# Patient Record
Sex: MALE | Race: WHITE | Employment: FULL TIME | URBAN - METROPOLITAN AREA
[De-identification: names, ages, dates, MRNs, and addresses within clinical notes are randomized per-mention and may not be internally consistent; named-entity substitution may affect disease eponyms.]

---

## 2020-10-02 ENCOUNTER — HOSPITAL ENCOUNTER (OUTPATIENT)
Dept: PHYSICAL THERAPY | Age: 50
Setting detail: THERAPIES SERIES
Discharge: HOME | End: 2020-10-02
Payer: COMMERCIAL

## 2020-10-02 PROCEDURE — 97110 THERAPEUTIC EXERCISES: CPT

## 2020-10-02 PROCEDURE — 97162 PT EVAL MOD COMPLEX 30 MIN: CPT

## 2020-10-02 NOTE — PROGRESS NOTES
[address]     Ph: [phone]  Fax: [phone]    [x] Certification  [] Recertification [x]  Plan of Care  [] Progress Note [] Discharge      To: Radha Goodson MD      From:  Sergo Neal  Patient: Aida Riding     : 1970  Diagnosis: unilateral primary osteoarthritis, right hip, Presence of unspecified artificial hip joint (right hip, 2020)     Date: 10/2/2020  Treatment Diagnosis: muscle weakness, decreased endurance, gait instability     Progress Report Period from:  10/2/2020  to 10/2/2020    Total # of Visits to Date: 1   No Show: 0    Canceled Appointment: 0     OBJECTIVE:   Short Term Goals - Time Frame for Short term goals: 2 weeks    Goals Current/Discharge status  Met   Short term goal 1: independent with HEP  Pending new HEP [] yes  [] no     Long Term Goals - Time Frame for Long term goals : 6 weeks  Goals Current/ Discharge status Met   Long term goal 1: Improve LEFS score by >/= 9 points to improve functional skills 27/80 [] yes  [] no   Long term goal 2: improve MMT scores to at least 4/5 throught RLE Strength RLE  Comment: hip 3/5 in flexion, abduction; knee:  at least 4+/5 in flex, ext; DF:  5/5  Strength LLE  Comment: hip:  4/5 throughout, knee:  4+/5 noting lack of full knee ext., DF:  5/5            [] yes  [] no   Long term goal 3:Improve gait distance to at least 1500' with most indep AD with uprigth posture Ambulation 1  Surface: carpet  Device: Rolling Walker  Assistance: Independent  Quality of Gait: With Front-wheeled walker:  slight favoring RLE, shortened step length with R, Upright symmetrical posture, once walker was raised to patient's needed height. Bilateral heel strike, which patient states he has intentionally worked to improve. Gait with SPcane:  marked limp favoring RLE, with standing showing marked weight shift to LLE.   Marked lean onto the cane with anticipated weight shift onto right. When climbing stairs, patient uses left hip rotation to lift LLE, compensating for decreased knee mobility. Patient appears to have an acquired leg length difference, which patient states he is aware of . Gait Deviations: Decreased step length, Decreased arm swing, Decreased step height  Distance: 1200 feet in 6-min walk test with front wheeled walker. Ambulation  Ambulation?: Yes  WB Status: to tolerance   [] yes  [] no   Long term goal 4: Pt will demo improved R Hip abd, ext, IR, and ER >/=10 deg to increase ease with Le dressing flex WNL's, abd 24, IR 15, ER 20, ext -10 from neutral (10/7/20)  [] yes  [] no     Body structures, Functions, Activity limitations: Decreased functional mobility , Decreased ADL status, Decreased ROM, Decreased strength, Decreased endurance, Decreased balance, Decreased posture  Assessment: Patient presented to outpatient clinic with Fwwalker, forward lean posture. Raised walker 1\" with noted improvement in upright posture. Patient uses walker independently, and showed safety in ambulation, transfers, and exercises. Patient showed marked limp, favoring RLE with cane use at this time. Patient was instructed to continue walker use at home, to maintain upright, symmetric gait posture. Patient would benefit from skilled PT services to improve strength, gait, balance, posture and endurance. Discussed with patient use of a shoe insert to counter leg length difference, and patient was receptive to this. Patient appears hard-working, and motivated to return to driving, work, and overall independence. He would benefit from PT services  to improve gait, balance, strength and endurance.   Specific instructions for Next Treatment: Begin HEP  Prognosis: Good      PT Education: Goals;PT Role;Plan of Care;General Safety;Gait Training    PLAN: [x] Evaluate and Treat  Frequency/Duration:  Plan  Times per week: 2  Plan weeks: 6 weeks  Specific instructions for Next Treatment: Begin HEP  Current Treatment Recommendations: Strengthening, ROM, Balance Training, Functional Mobility Training, Endurance Training, Gait Training, Stair training, Neuromuscular Re-education, Manual Therapy - Soft Tissue Mobilization, Manual Therapy - Joint Manipulation, Pain Management, Home Exercise Program, Safety Education & Training, Patient/Caregiver Education & Training, Modalities, Positioning  Plan Comment: transfer to [facility]     Precautions:Restrictions/Precautions: Up as Tolerated                           Patient Status:[x] Continue/ Initiate plan of Care    [] Discharge PT. Recommend pt continue with HEP. [] Additional visits requested, Please re-certify for additional visits:          Signature: Electronically signed by Fernando Adorno on 10/2/20 at 4:09 PM EDT    ADDENDUM CREATED TO UPDATE GOALS 10/7/20 Electronically signed by Lucio Leon PT on 10/7/2020 at 11:52 AM    If you have any questions or concerns, please don't hesitate to call. Thank you for your referral.    I have reviewed this plan of care and certify a need for medically necessary rehabilitation services.     Physician Signature:__________________________________________________________  Date:  Please sign and return

## 2020-10-02 NOTE — PROGRESS NOTES
[address]  PHYSICAL THERAPY EVALUATION    Date: 10/2/2020  Patient Name: Chelle Ortiz       MRN: [id number]   Account: [de-identified]   : 1970  (44 y.o.)   Gender: male   Referring Practitioner: Haseeb Machado MD                 Diagnosis: unilateral primary osteoarthritis, right hip, Presence of unspecified artificial hip joint (right hip, 2020)  Treatment Diagnosis: muscle weakness, decreased endurance, gait instability  Additional Pertinent Hx: Previous knee replacements, bilat;  arthritis, HTN, with medication; and previous rotator cuff surgery        Past Medical History:  has no past medical history on file. Past Surgical History:   has no past surgical history on file. Vital Signs  Patient Currently in Pain: No   Pain Screening  Patient Currently in Pain: No      Lives With: Spouse  Type of Home: House  Home Layout: One level  Home Access: Stairs to enter with rails  Entrance Stairs - Number of Steps: 2 with rail on right going up  Entrance Stairs - Rails: Right  Bathroom Shower/Tub: Walk-in shower  Bathroom Toilet: Handicap height  Bathroom Equipment: Grab bars in shower  Bathroom Accessibility: Accessible  Home Equipment: Rolling walker;Cane;Grab bars  Receives Help From: Family  ADL Assistance: Needs assistance  Dressing: Minimal assistance  Homemaking Assistance: Needs assistance  Ambulation Assistance: Independent(uses fwwalker)  Transfer Assistance: Independent  Active : No(Patient plans to return to driving. not currently driving)  Mode of Transportation: Car  Occupation: Full time employment  Type of occupation: standing work  Leisure & Hobbies: golf        Subjective:  Subjective: Patient presents with front wheeled walker, reports recent right hip replacement, on 2020. States he received home care physical therapy, including instruction for cane use.   States he does not use a cane now, would like to progress, to eventually gait with no AD. Patient has previously undergone both left and right knee replacements, and had rotator cuff surgery in the past.  Patient takes blood pressure meds for HTN. Patient states he has no hip precautions. Prior level of function:  independent in all ADLs, including driving, and full time work, which involves standing throughout most of the day. Objective:   Sensation  Overall Sensation Status: WFL    Ambulation 1  Surface: carpet  Device: Rolling Walker  Assistance: Independent  Quality of Gait: With Front-wheeled walker:  slight favoring RLE, shortened step length with R, Upright symmetrical posture, once walker was raised to patient's needed height. Bilateral heel strike, which patient states he has intentionally worked to improve. Gait with SPcane:  marked limp favoring RLE, with standing showing marked weight shift to LLE. Marked lean onto the cane with anticipated weight shift onto right. When climbing stairs, patient uses left hip rotation to lift LLE, compensating for decreased knee mobility. Patient appears to have an acquired leg length difference, which patient states he is aware of . Gait Deviations: Decreased step length, Decreased arm swing, Decreased step height  Distance: 1200 feet in 6-min walk test with front wheeled walker.   Stairs  # Steps : 12  Stairs Height: 6\"  Rails: Right ascending  Device: No Device, Rolling walker     Transfers  Sit to Stand: Independent  Stand to sit: Modified independent  Bed to Chair: Modified independent  Stand Pivot Transfers: Modified independent    Strength RLE  Comment: hip 3/5 in flexion, abduction; knee:  at least 4+/5 in flex, ext; DF:  5/5  Strength LLE  Comment: hip:  4/5 throughout, knee:  4+/5 noting lack of full knee ext., DF:  5/5   AROM RLE (degrees)  RLE AROM: WFL     AROM LLE (degrees)  LLE AROM : WFL(lacking full left knee ext)           Bed mobility  Rolling to Left: Independent  Rolling to Right: Independent  Supine to Sit: Independent  Sit to Supine: Independent       Exercises:   Exercises  Exercise 1: Sidelying right hip abduction, able to to 5 with noted fatigue  Exercise 2: Supine hip abd x10  Exercise 3: Supine SLR,  RLE x10; LLE x10, with fatigue noted  Exercise 20: begin HEP    *Indicates exercise,modality, or manual techniques to be initiated when appropriate    Assessment: Body structures, Functions, Activity limitations: Decreased functional mobility , Decreased ADL status, Decreased ROM, Decreased strength, Decreased endurance, Decreased balance, Decreased posture  Assessment: Patient presented to outpatient clinic with Fwwalker, forward lean posture. Raised walker 1\" with noted improvement in upright posture. Patient uses walker independently, and showed safety in ambulation, transfers, and exercises. Patient showed marked limp, favoring RLE with cane use at this time. Patient was instructed to continue walker use at home, to maintain upright, symmetric gait posture. Patient would benefit from skilled PT services to improve strength, gait, balance, posture and endurance. Discussed with patient use of a shoe insert to counter leg length difference, and patient was receptive to this. Patient appears hard-working, and motivated to return to driving, work, and overall independence. He would benefit from PT services  to improve gait, balance, strength and endurance.   Specific instructions for Next Treatment: Begin HEP  Prognosis: Good  Activity Tolerance: Patient Tolerated treatment well     Decision Making: Medium Complexity     Plan  Frequency/Duration:  Plan  Times per week: 2  Plan weeks: 6 weeks  Specific instructions for Next Treatment: Begin HEP  Current Treatment Recommendations: Strengthening, ROM, Balance Training, Functional Mobility Training, Endurance Training, Gait Training, Stair training, Neuromuscular Re-education, Manual Therapy - Soft Tissue Mobilization, Manual Therapy - Joint Manipulation, Pain Management, Home Exercise Program, Safety Education & Training, Patient/Caregiver Education & Training, Modalities, Positioning  Plan Comment: transfer to [facility]     Patient Education  New Education Provided: PT Education: Goals;PT Role;Plan of Care;General Safety;Gait Training    POST-PAIN     Pain Rating (0-10 pain scale):  0 /10  Location and pain description same as pre-treatment unless indicated. Action: [x] NA  [] Call Physician  [] Perform HEP  [] Meds as prescribed    Evaluation and patient rights have been reviewed and patient agrees with plan of care. Yes  [x]  No  []   Explain:       [facility]ka Fall Risk Assessment  Risk Factor Scale  Score   History of Falls [] Yes  [x] No 25  0 0   Secondary Diagnosis [] Yes  [x] No 15  0 0   Ambulatory Aid [] Furniture  [x] Crutches/cane/walker  [] None/bedrest/wheelchair/nurse 30  15  0 15   IV/Heparin Lock [] Yes  [x] No 20  0 0   Gait/Transferring [] Impaired  [x] Weak  [] Normal/bedrest/immobile 20  10  0 10   Mental Status [] Forgets limitations  [x] Oriented to own ability 15  0 0      Total:25     Based on the Assessment score: check the appropriate box. []  No intervention needed   Low =   Score of 0-24  [x]  Use standard prevention interventions Moderate =  Score of 24-44   [x] Discuss fall prevention strategies   [x] Indicate moderate falls risk on eval  []  Use high risk prevention interventions High = Score of 45 and higher   [] Discuss fall prevention strategies   [] Provide supervision during treatment time    Goals  Short term goals  Time Frame for Short term goals: 2 weeks  Short term goal 1: independent with HEP  Long term goals  Time Frame for Long term goals : 6 weeks  Long term goal 1: Improve LEFS score by >/= 9 points to improve functional skills  Long term goal 2: improve MMT scores to at least 4/5 throught RLE  Long term goal 3: Improve gait distance to at least 1500' with most indep AD.   Long term goal 4: Progress to cane use with upright, symmetric posture     PT Individual Minutes  Time In: 5180  Time Out: 1436  Minutes: 61  Timed Code Treatment Minutes: 61 Minutes  Procedure Minutes:51     Timed Activity Minutes Units   Ther Ex 10 1       Electronically signed by Go Aquino on 10/2/20 at 3:52 PM EDT

## 2020-10-05 ENCOUNTER — HOSPITAL ENCOUNTER (OUTPATIENT)
Dept: PHYSICAL THERAPY | Age: 50
Setting detail: THERAPIES SERIES
Discharge: HOME | End: 2020-10-05
Payer: COMMERCIAL

## 2020-10-05 PROCEDURE — 97110 THERAPEUTIC EXERCISES: CPT

## 2020-10-05 PROCEDURE — 97140 MANUAL THERAPY 1/> REGIONS: CPT

## 2020-10-05 ASSESSMENT — PAIN DESCRIPTION - ORIENTATION: ORIENTATION: RIGHT

## 2020-10-05 ASSESSMENT — PAIN DESCRIPTION - DESCRIPTORS: DESCRIPTORS: SORE

## 2020-10-05 ASSESSMENT — PAIN DESCRIPTION - PAIN TYPE: TYPE: ACUTE PAIN

## 2020-10-05 ASSESSMENT — PAIN SCALES - GENERAL: PAINLEVEL_OUTOF10: 2

## 2020-10-05 ASSESSMENT — PAIN DESCRIPTION - LOCATION: LOCATION: HIP

## 2020-10-05 NOTE — PROGRESS NOTES
[address] [address]  Outpatient Physical Therapy    Treatment Note        Date: 10/5/2020  Patient: Zafar Gambino  : 1970  ACCT #: [de-identified]  Referring Practitioner: Freddy Gibbs MD  Diagnosis: unilateral primary osteoarthritis, right hip, Presence of unspecified artificial hip joint (right hip, 2020)    Visit Information:  PT Visit Information  Total # of Visits to Date: 2  No Show: 0  Canceled Appointment: 0  Progress Note Counter: 10    Subjective: Pt presenting to appt reporting 2/10 pain level currently stating \"I took a Percocet before I came but I feel like it's getting better every day. \"     HEP Compliance:  [x] Good [] Fair [] Poor [] Reports not doing due to:    Vital Signs  Patient Currently in Pain: Yes   Pain Screening  Patient Currently in Pain: Yes  Pain Assessment  Pain Assessment: 0-10  Pain Level: 2  Pain Type: Acute pain  Pain Location: Hip  Pain Orientation: Right  Pain Descriptors: Sore    OBJECTIVE:   Exercises  Exercise 1: Sidelying right hip abduction (progress as able)*  Exercise 2: Supine hip abd/flex w/ sliding board x20 ea  Exercise 3: Supine SLR w/ QS between reps,  RLE x10; LLE x10, with fatigue noted (cues needed to re  Exercise 4: SciFit L1 5 min  Exercise 5: H/L ABD/ADD 5\"x15 ea YTB  Exercise 6: Clams x10 Rt only  Exercise 7: Seated HS stretch w/ stool 3x30\" Rt  Exercise 8: Mod saundra stretch 3x30\" Rt  Exercise 20: HEP: mod saundra stretch, H/L hip ABD/ADD, clams    Strength: [x] NT  [] MMT completed:      ROM: [x] NT  [] ROM measurements:      Manual:   Manual therapy  PROM: RT hip: Supine flexion, IR/ER as tolerated  Soft Tissue Mobalization: Foam roll massage to Rt lat hip/ITB  Other: 8 min total    Modalities:  Modalities  Cryotherapy (Minutes\Location): Pt declined CP post tx     *Indicates exercise, modality, or manual techniques to be initiated when appropriate    Assessment:       Body structures, Functions, Activity limitations: Decreased functional mobility , Decreased ADL status, Decreased ROM, Decreased strength, Decreased endurance, Decreased balance, Decreased posture  Assessment: Initiated PT program per POC w/ focus on Rt hip strengthening and ROM s/p THR. Good cameron to exs however inc cuing needed to reduce quad lag and compensations w/ SLR. Significant weaknees remains w/ S/L hip ABD therefor held and cont'd w/ clams and ABD in H/L until able to cameron. Performed STM and PROM to Rt hip w/ inc relief of tightness. Surgical incision appears to be ino nicely upon therapist observation. Pt declining CP and denies inc from initial pain level post tx. Treatment Diagnosis: muscle weakness, decreased endurance, gait instability  Prognosis: Good     Goals:  Short term goals  Time Frame for Short term goals: 2 weeks  Short term goal 1: independent with HEP    Long term goals  Time Frame for Long term goals : 6 weeks  Long term goal 1: Improve LEFS score by >/= 9 points to improve functional skills  Long term goal 2: improve MMT scores to at least 4/5 throught RLE  Long term goal 3: Improve gait distance to at least 1500' with most indep AD. Long term goal 4: Progress to cane use with upright, symmetric posture  Progress toward goals: Rt LE strength, Rt hip ROM     POST-PAIN       Pain Rating (0-10 pain scale):   2/10   Location and pain description same as pre-treatment unless indicated.    Action: [] NA   [x] Perform HEP  [] Meds as prescribed  [x] Modalities as prescribed   [] Call Physician     Frequency/Duration:  Plan  Times per week: 2  Plan weeks: 6 weeks  Current Treatment Recommendations: Strengthening, ROM, Balance Training, Functional Mobility Training, Endurance Training, Gait Training, Stair training, Neuromuscular Re-education, Manual Therapy - Soft Tissue Mobilization, Manual Therapy - Joint Manipulation, Pain Management, Home Exercise Program, Safety Education & Training, Patient/Caregiver Education & Training, Modalities, Positioning  Plan Comment: transfer to [facility]     Pt to continue current HEP. See objective section for any therapeutic exercise changes, additions or modifications this date.     PT Individual Minutes  Time In: 8281  Time Out: 1200  Minutes: 39  Timed Code Treatment Minutes: 39 Minutes  Procedure Minutes: N/A      Timed Activity Minutes Units   Ther Ex 31 2   Manual  8 1       Signature:  Electronically signed by Jane Beach PTA on 10/5/20 at 12:49 PM EDT

## 2020-10-07 ENCOUNTER — HOSPITAL ENCOUNTER (OUTPATIENT)
Dept: PHYSICAL THERAPY | Age: 50
Setting detail: THERAPIES SERIES
Discharge: HOME | End: 2020-10-07
Payer: COMMERCIAL

## 2020-10-07 PROCEDURE — 97110 THERAPEUTIC EXERCISES: CPT

## 2020-10-07 PROCEDURE — 97116 GAIT TRAINING THERAPY: CPT

## 2020-10-07 ASSESSMENT — PAIN DESCRIPTION - ORIENTATION: ORIENTATION: RIGHT

## 2020-10-07 ASSESSMENT — PAIN DESCRIPTION - LOCATION: LOCATION: HIP

## 2020-10-07 ASSESSMENT — PAIN DESCRIPTION - DESCRIPTORS: DESCRIPTORS: SORE

## 2020-10-07 ASSESSMENT — PAIN SCALES - GENERAL: PAINLEVEL_OUTOF10: 3

## 2020-10-07 ASSESSMENT — PAIN DESCRIPTION - PAIN TYPE: TYPE: ACUTE PAIN;SURGICAL PAIN

## 2020-10-07 NOTE — PROGRESS NOTES
AROM: flex WNL's, abd 24, IR 15, ER 20, ext -10 from neutral    Modalities:  Declined     *Indicates exercise, modality, or manual techniques to be initiated when appropriate    Assessment: Body structures, Functions, Activity limitations: Decreased functional mobility , Decreased ADL status, Decreased ROM, Decreased strength, Decreased endurance, Decreased balance, Decreased posture  Assessment: Further assessed pt R hip AROM this date with limitations noted in abd, Ext, IR, and ER goal added to POC to further address. Progressed functional wbing ex this date for improved LE strength with good tolerance. Pt able to amb around clinic without AD without evidence of instability. Treatment Diagnosis: muscle weakness, decreased endurance, gait instability  Prognosis: Good     Goals:  Short term goals  Time Frame for Short term goals: 2 weeks  Short term goal 1: independent with HEP    Long term goals  Time Frame for Long term goals : 6 weeks  Long term goal 1: Improve LEFS score by >/= 9 points to improve functional skills  Long term goal 2: improve MMT scores to at least 4/5 throught RLE  Long term goal 3: Improve gait distance to at least 1500' with most indep AD with uprigth posture(updated 10/7/20)  Long term goal 4: Pt will demo improved R Hip abd, ext, IR, and ER >/=10 deg to increase ease with Le dressing  Progress toward goals:    POST-PAIN       Pain Rating (0-10 pain scale):   3/10   Location and pain description same as pre-treatment unless indicated.    Action: [] NA   [x] Perform HEP  [x] Meds as prescribed  [x] Modalities as prescribed   [] Call Physician     Frequency/Duration:  Plan  Times per week: 2  Plan weeks: 6 weeks  Current Treatment Recommendations: Strengthening, ROM, Balance Training, Functional Mobility Training, Endurance Training, Gait Training, Stair training, Neuromuscular Re-education, Manual Therapy - Soft Tissue Mobilization, Manual Therapy - Joint Manipulation, Pain Management, Home Exercise Program, Safety Education & Training, Patient/Caregiver Education & Training, Modalities, Positioning  Plan Comment: transfer to [facility]     Pt to continue current HEP. See objective section for any therapeutic exercise changes, additions or modifications this date.     PT Individual Minutes  Time In: 1120  Time Out: 1200  Minutes: 40  Timed Code Treatment Minutes: 39 Minutes  Procedure Minutes: 0     Timed Activity Minutes Units   Gait  7 1   TherEx 32 2       Signature:  Electronically signed by Jess Patrick PT on 10/7/20 at 12:05 PM EDT

## 2020-10-12 ENCOUNTER — HOSPITAL ENCOUNTER (OUTPATIENT)
Dept: PHYSICAL THERAPY | Age: 50
Setting detail: THERAPIES SERIES
Discharge: HOME | End: 2020-10-12
Payer: COMMERCIAL

## 2020-10-12 PROCEDURE — 97110 THERAPEUTIC EXERCISES: CPT

## 2020-10-12 ASSESSMENT — PAIN DESCRIPTION - FREQUENCY: FREQUENCY: INTERMITTENT

## 2020-10-12 ASSESSMENT — PAIN DESCRIPTION - PAIN TYPE: TYPE: SURGICAL PAIN

## 2020-10-12 ASSESSMENT — PAIN DESCRIPTION - DESCRIPTORS: DESCRIPTORS: ACHING

## 2020-10-12 ASSESSMENT — PAIN SCALES - GENERAL: PAINLEVEL_OUTOF10: 3

## 2020-10-12 ASSESSMENT — PAIN DESCRIPTION - LOCATION: LOCATION: HIP

## 2020-10-12 ASSESSMENT — PAIN DESCRIPTION - ORIENTATION: ORIENTATION: LEFT

## 2020-10-14 ENCOUNTER — HOSPITAL ENCOUNTER (OUTPATIENT)
Dept: PHYSICAL THERAPY | Age: 50
Setting detail: THERAPIES SERIES
Discharge: HOME | End: 2020-10-14
Payer: COMMERCIAL

## 2020-10-14 PROCEDURE — 97110 THERAPEUTIC EXERCISES: CPT

## 2020-10-14 ASSESSMENT — PAIN DESCRIPTION - PAIN TYPE: TYPE: SURGICAL PAIN

## 2020-10-14 ASSESSMENT — PAIN DESCRIPTION - LOCATION: LOCATION: HIP

## 2020-10-14 ASSESSMENT — PAIN DESCRIPTION - ORIENTATION: ORIENTATION: LEFT

## 2020-10-14 ASSESSMENT — PAIN SCALES - GENERAL: PAINLEVEL_OUTOF10: 2

## 2020-10-14 ASSESSMENT — PAIN DESCRIPTION - DESCRIPTORS: DESCRIPTORS: SORE

## 2020-10-14 NOTE — PROGRESS NOTES
[id number] [address]  Outpatient Physical Therapy    Treatment Note        Date: 10/14/2020  Patient: Khloe Bangura  : 1970  ACCT #: [de-identified]  Referring Practitioner: Corina Wright MD  Diagnosis: unilateral primary osteoarthritis, right hip, Presence of unspecified artificial hip joint (right hip, 2020)    Visit Information:  PT Visit Information  PT Insurance Information: Medical [place]  Total # of Visits to Date: 5  No Show: 0  Canceled Appointment: 0  Progress Note Counter: 5/10    Subjective: Pt reports having some soreness in Hip. Thought he pulled a mm in LB with high knee marching though has since decreased. Comments: RTD ~10/20/20  HEP Compliance:  [x] Good [] Fair [] Poor [] Reports not doing due to:    Vital Signs  Patient Currently in Pain: Yes   Pain Screening  Patient Currently in Pain: Yes  Pain Assessment  Pain Assessment: 0-10  Pain Level: 2  Pain Type: Surgical pain  Pain Location: Hip  Pain Orientation: Left  Pain Descriptors: Sore    OBJECTIVE:   Exercises  Exercise 1: Sidelying right hip abduction x15, circles x10  Exercise 2: step down 8\"x10  Exercise 3: Supine SLR w/ QS between reps x15 lisa  Exercise 4: SciFit L2.5 5 min for increased functional hip strength  Exercise 6: Clams x15 Rt only  Exercise 8: Mod saundra stretch 3x30\" Rt  Exercise 9: 3 way rockerboard without UE's large x20  Exercise 10: step ups 8\" F/L x10 without UE's  Exercise 11: bridge 5\"x15  Exercise 12: gait drills along coutner top: SLS with march x3 laps ea. Exercise 13: SLS 15\"x3  Exercise 14: 4 way hip with RTB Lisa x10  Exercise 15: prone hip ext x8  Exercise 20: HEP: 4 way hip     Strength: [x] NT  [] MMT completed:     ROM: [x] NT  [] ROM measurements:    *Indicates exercise, modality, or manual techniques to be initiated when appropriate    Assessment:    Body structures, Functions, Activity limitations: Decreased functional mobility , Decreased ADL status, Decreased ROM, Decreased strength, Decreased endurance, Decreased balance, Decreased posture  Assessment: Progressed ther ex this date for further increase in functional R LE strength and stability with good tolerance. Decreased reports of pain following tx this date. Treatment Diagnosis: muscle weakness, decreased endurance, gait instability  Prognosis: Good     Goals:  Short term goals  Time Frame for Short term goals: 2 weeks  Short term goal 1: independent with HEP    Long term goals  Time Frame for Long term goals : 6 weeks  Long term goal 1: Improve LEFS score by >/= 9 points to improve functional skills  Long term goal 2: improve MMT scores to at least 4/5 throught RLE  Long term goal 3: Improve gait distance to at least 1500' with most indep AD with uprigth posture(updated 10/7/20)  Long term goal 4: Pt will demo improved R Hip abd, ext, IR, and ER >/=10 deg to increase ease with LE dressing  Progress toward goals: good    POST-PAIN       Pain Rating (0-10 pain scale):  \"almost not there, better than when I came in\" /10   Location and pain description same as pre-treatment unless indicated. Action: [] NA   [x] Perform HEP  [] Meds as prescribed  [] Modalities as prescribed   [] Call Physician     Frequency/Duration:  Plan  Times per week: 2  Plan weeks: 6 weeks  Current Treatment Recommendations: Strengthening, ROM, Balance Training, Functional Mobility Training, Endurance Training, Gait Training, Stair training, Neuromuscular Re-education, Manual Therapy - Soft Tissue Mobilization, Manual Therapy - Joint Manipulation, Pain Management, Home Exercise Program, Safety Education & Training, Patient/Caregiver Education & Training, Modalities, Positioning  Plan Comment: transfer to [facility]     Pt to continue current HEP. See objective section for any therapeutic exercise changes, additions or modifications this date.     PT Individual Minutes  Time In: 1121  Time Out: 1200  Minutes: 39  Timed Code Treatment Minutes: 38 Minutes  Procedure Minutes: 0     Timed Activity Minutes Units   Ther Ex 38 3       Signature:  Electronically signed by Regina Saez PT on 10/14/20 at 12:03 PM EDT

## 2020-10-19 ENCOUNTER — HOSPITAL ENCOUNTER (OUTPATIENT)
Dept: PHYSICAL THERAPY | Age: 50
Setting detail: THERAPIES SERIES
Discharge: HOME | End: 2020-10-19
Payer: COMMERCIAL

## 2020-10-19 PROCEDURE — 97110 THERAPEUTIC EXERCISES: CPT

## 2020-10-19 ASSESSMENT — PAIN SCALES - GENERAL: PAINLEVEL_OUTOF10: 2

## 2020-10-19 ASSESSMENT — PAIN DESCRIPTION - LOCATION: LOCATION: HIP

## 2020-10-19 ASSESSMENT — PAIN DESCRIPTION - ORIENTATION: ORIENTATION: RIGHT

## 2020-10-19 ASSESSMENT — PAIN DESCRIPTION - PAIN TYPE: TYPE: SURGICAL PAIN

## 2020-10-19 ASSESSMENT — PAIN DESCRIPTION - DESCRIPTORS: DESCRIPTORS: ACHING

## 2020-10-19 NOTE — PROGRESS NOTES
0     Timed Activity Minutes Units   Ther Ex 38 3       Signature:  Electronically signed by Dashawn Esposito PTA on 10/19/20 at 10:37 AM EDT

## 2020-10-21 ENCOUNTER — HOSPITAL ENCOUNTER (OUTPATIENT)
Dept: PHYSICAL THERAPY | Age: 50
Setting detail: THERAPIES SERIES
Discharge: HOME | End: 2020-10-21
Payer: COMMERCIAL

## 2020-10-21 PROCEDURE — 97110 THERAPEUTIC EXERCISES: CPT

## 2020-10-21 NOTE — PROGRESS NOTES
[address], [address]     Ph: [phone]  Fax: [phone]    [] Certification  [] Recertification [x]  Plan of Care  [x] Progress Note [] Discharge      To:   Donta Caldwell MD      From:  Lan Martini, PT, DPT  Patient: Gorge Mcgraw     : 1970  Diagnosis: unilateral primary osteoarthritis, right hip, Presence of unspecified artificial hip joint (right hip, 2020)     Date: 10/21/2020  Treatment Diagnosis: decreased B LE strength, decreased B SLS stability, decreased L knee AROM, decreased R hip AROM, decreased endurance, gait instability, decreased functional activity tolerance, L knee pain, R hip pain     Progress Report Period from:  10/2/2020  to 10/21/2020    Total # of Visits to Date: 7   No Show: 0    Canceled Appointment: 0     OBJECTIVE:   Short Term Goals - Time Frame for Short term goals: 2 weeks    Goals Current/Discharge status  Met   Short term goal 1: independent with HEP  Compliant with ongoing HEP [x] yes  [x] no     Long Term Goals - Time Frame for Long term goals : 6 weeks  Goals Current/ Discharge status Met   Long term goal 1: Improve LEFS score >/=50/80 points to improve functional activity tolerance and return to PLOF   Updated this date  37/80 [] yes  [x] no   Long term goal 2: improve Jerad LE MMT scores to at least 4+ to 5/5 in order to return to PLOF work duties   Updated this date  Strength RLE  Comment: 4/5 hip flex, IR, abd, ext 4-/5 Hip ER 5/5 knee, ankle DF  Strength LLE  Comment: 4+/5 hip flex, IR, ER, knee 5/5 ankle DF 4-/5 hip ext 4/5 hip abd [x] yes  [x] no   Long term goal 3: Improve gait to unlimited distances without AD indep with upright posture and minimal deviations  Updated this date  Ambulation 1  Surface: carpet  Device: No Device  Assistance: Independent  Quality of Gait: pt with slight R toe in and decreased R Wbing/antalgia, and decreased L knee arom  Distance: within dept [x] yes  [x] no   Long term goal 4: Pt will demo improved R Hip abd, ext, IR, and ER >/=10 deg to increase ease with LE dressing AROM RLE (degrees)  RLE General AROM: Hip flex 110, abd 32, IR 50, ER 25, ext -3 from neutral; knee 0-104 in supine [x] yes  [x] no   Long term goal 5: Pt will demo improve L knee flexion AROM >/=90 deg in roder to descend stairs recip indep with improved quality and no evidence of instability AROM LLE (degrees)  LLE General AROM: knee -2-76 in supine    Stairs  # Steps : 12  Stairs Height: 6\"  Rails: None  Device: No Device  Assistance: Modified independent   Comment: Reciprocal with increased instability and decreased L knee ecentric control descending [] yes  [x] no      Body structures, Functions, Activity limitations: Decreased functional mobility , Decreased ADL status, Decreased ROM, Decreased strength, Decreased endurance, Decreased balance, Decreased posture  Assessment: Pt presents this date with onset of L knee pain possibly d/t increased activity and compensations for contd R hip weakness s/p THR. Pt reassessed this date with evidence of contd B LE instability, contd decreased R hip ext AROM, and decreased L knee AROM. These impairments currently limit his functional abilities to ambulate and stand prolonged periods as well as perform stairs or work related duties without increased pain or limitations at PLOF. Pt would benefit from further PT with the addition of addressing L knee deficits to further increase functional activity tolerance and return to work duties.   Prognosis: Good  Discharge Recommendations: Continue to assess pending progress    PLAN:   Frequency/Duration:  Plan  Times per week: 2  Plan weeks: 6 weeks  Current Treatment Recommendations: Strengthening, ROM, Balance Training, Functional Mobility Training, Endurance Training, Gait Training, Stair training, Neuromuscular Re-education, Manual Therapy - Soft Tissue Mobilization, Manual

## 2020-10-21 NOTE — PROGRESS NOTES
[id number] [address]  Outpatient Physical Therapy    Treatment Note        Date: 10/21/2020  Patient: Lainey Ybarra  : 1970  ACCT #: [de-identified]  Referring Practitioner: Guillermina Richard MD  Diagnosis: unilateral primary osteoarthritis, right hip, Presence of unspecified artificial hip joint (right hip, 2020)    Visit Information:  PT Visit Information  PT Insurance Information: Medical [place]  Total # of Visits to Date: 7  No Show: 0  Canceled Appointment: 0  Progress Note Counter:     Subjective: Pt saw MD yesterday who told him no running or golfing, has to f/u again in 6 weeks with tentative RTW in 7 weeks. Reports feeling \"tight\" this AM d/t over doing it yesterday with activity. Pt also reports L knee has been bothering him lately with decreased mobility/strength and slight increased pain. Comments: RTD 6 weeks  HEP Compliance:  [x] Good [] Fair [] Poor [] Reports not doing due to:    Vital Signs  Patient Currently in Pain: Denies   Pain Screening  Patient Currently in Pain: Denies    OBJECTIVE:   Exercises  Exercise 1: Sidelying Jerad abduction x20, circles x15  Exercise 2: step down*  Exercise 3: wall squats 5\"x10  Exercise 4: SciFit L3.2 5 min for increased functional hip strength  Exercise 6: Clams x20 Jerad  Exercise 10: step ups 8\" F/L x15 without UE's  Exercise 11: bridge 5\"x20  Exercise 13: SLS 20\"x3  Exercise 14: 4 way hip with RTB Jerad x15  Exercise 15: prone hip ext x10 b/l  Exercise 17: BOSU lunges*  Exercise 18:  L Heel slides when MD signs of on updated POC*  Exercise 20: HEP: cont current    Balance  Single Leg Stance R Le  Single Leg Stance L Le  Comments: mod frontal plane instability jerad    Ambulation 1  Surface: carpet  Device: No Device  Assistance: Independent  Quality of Gait: pt with slight R toe in and decreased R Wbing/antalgia, and decreased L knee arom  Distance: within dept    Stairs  # Steps : 12  Stairs Height: 6\"  Rails: None  Device: No Device  Assistance: Modified independent   Comment: Reciprocal with increased instability and decreased L knee ecentric control descending    Strength: [] NT  [x] MMT completed:  Strength RLE  Comment: 4/5 hip flex, IR, abd, ext 4-/5 Hip ER 5/5 knee, ankle DF  Strength LLE  Comment: 4+/5 hip flex, IR, ER, knee 5/5 ankle DF 4-/5 hip ext 4/5 hip abd    ROM: [] NT  [x] ROM measurements:     AROM RLE (degrees)  RLE General AROM: Hip flex 110, abd 32, IR 50, ER 25, ext -3 from neutral; knee 0-104 in supine     AROM LLE (degrees)  LLE General AROM: knee -2-76 in supine     *Indicates exercise, modality, or manual techniques to be initiated when appropriate    Assessment: Body structures, Functions, Activity limitations: Decreased functional mobility , Decreased ADL status, Decreased ROM, Decreased strength, Decreased endurance, Decreased balance, Decreased posture  Assessment: Pt presents this date with onset of L knee pain possibly d/t increased activity and compensations for contd R hip weakness s/p THR. Pt reassessed this date with evidence of contd B LE instability, contd decreased R hip ext AROM, and decreased L knee AROM. These impairments currently limit his functional abilities to ambulate and stand prolonged periods as well as perform stairs or work related duties without increased pain or limitations at PLOF. Pt would benefit from further PT with the addition of addressing L knee deficits to further increase functional activity tolerance and return to work duties.   Treatment Diagnosis: decreased B LE strength, decreased B SLS stability, decreased L knee AROM, decreased R hip AROM, decreased endurance, gait instability, decreased functional activity tolerance, L knee pain, R hip pain  Prognosis: Good     Goals:  Short term goals  Time Frame for Short term goals: 2 weeks  Short term goal 1: independent with HEP    Long term goals  Time Frame for Long term goals : 6 weeks  Long term goal 1: Improve LEFS score >/=50/80 points to improve functional activity tolerance and return to PLOF(updated 10/21/20)  Long term goal 2: improve Jerad LE MMT scores to at least 4+ to 5/5 in order to return to PLOF work duties(updated 10/21/20)  Long term goal 3: Improve gait to unlimited distances without AD indep with upright posture and minimal deviations(updated 10/21/20)  Long term goal 4: Pt will demo improved R Hip abd, ext, IR, and ER >/=10 deg to increase ease with LE dressing  Long term goal 5: Pt will demo improve L knee flexion AROM >/=90 deg in roder to descend stairs recip indep with improved quality and no evidence of instability  Progress toward goals: good, see updated POC/PN    POST-PAIN       Pain Rating (0-10 pain scale):  0 /10   Location and pain description same as pre-treatment unless indicated. Action: [] NA   [x] Perform HEP  [] Meds as prescribed  [] Modalities as prescribed   [] Call Physician     Frequency/Duration:  Plan  Times per week: 2  Plan weeks: 6 weeks  Current Treatment Recommendations: Strengthening, ROM, Balance Training, Functional Mobility Training, Endurance Training, Gait Training, Stair training, Neuromuscular Re-education, Manual Therapy - Soft Tissue Mobilization, Manual Therapy - Joint Manipulation, Pain Management, Home Exercise Program, Safety Education & Training, Patient/Caregiver Education & Training, Modalities, Positioning  Plan Comment: transfer to [facility]     Pt to continue current HEP. See objective section for any therapeutic exercise changes, additions or modifications this date.     PT Individual Minutes  Time In: 5186  Time Out: 1116  Minutes: 38  Timed Code Treatment Minutes: 38 Minutes  Procedure Minutes: 0     Timed Activity Minutes Units   Ther Ex 38 3     Signature:  Electronically signed by Emiliano Cummings PT on 10/21/20 at 11:21 AM EDT

## 2020-10-26 ENCOUNTER — HOSPITAL ENCOUNTER (OUTPATIENT)
Dept: PHYSICAL THERAPY | Age: 50
Setting detail: THERAPIES SERIES
Discharge: HOME | End: 2020-10-26
Payer: COMMERCIAL

## 2020-10-26 PROCEDURE — 97110 THERAPEUTIC EXERCISES: CPT

## 2020-10-26 ASSESSMENT — PAIN DESCRIPTION - PAIN TYPE: TYPE: ACUTE PAIN

## 2020-10-26 ASSESSMENT — PAIN DESCRIPTION - ORIENTATION: ORIENTATION: LEFT

## 2020-10-26 ASSESSMENT — PAIN DESCRIPTION - LOCATION: LOCATION: KNEE

## 2020-10-26 ASSESSMENT — PAIN DESCRIPTION - DESCRIPTORS: DESCRIPTORS: SORE

## 2020-10-26 ASSESSMENT — PAIN SCALES - GENERAL: PAINLEVEL_OUTOF10: 3

## 2020-10-26 NOTE — PROGRESS NOTES
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 Comments: RTD 6 weeks  HEP Compliance:  [x] Good [] Fair [] Poor [] Reports not doing due to:    Vital Signs  Patient Currently in Pain: Yes(R hip 0/10)   Pain Screening  Patient Currently in Pain: Yes(R hip 0/10)  Pain Assessment  Pain Level: 3  Pain Type: Acute pain  Pain Location: Knee  Pain Orientation: Left  Pain Descriptors: Sore    OBJECTIVE:   Exercises  Exercise 1: Sidelying Lamberto abduction x20, circles x15  Exercise 2: step down*  Exercise 3: wall squats 5\"x10  Exercise 4: SciFit L3.2 5 min for increased functional hip strength  Exercise 6: Clams x20 Lamberto  Exercise 10: step ups 8\" F/L x15 without UE's  Exercise 11: bridge 5\"x20  Exercise 13: SLS 20\"x3  Exercise 14: 4 way hip with RTB Lamberto x15  Exercise 15: prone hip ext x10 b/l  Exercise 17: BOSU lunges x 10 LAMBERTO  Exercise 18: L Heel slides when MD signs of on updated POC*  Exercise 20: HEP: cont current     Strength: [x] NT  [] MMT completed:     ROM: [] NT  [] ROM measurements:     AROM RLE (degrees)  RLE General AROM: Hip flex 110, abd 32, IR 50, ER 25, ext -3 from neutral; knee 0-104 in supine     AROM LLE (degrees)  LLE General AROM: knee -5-84 in supine        Assessment:       Body structures, Functions, Activity limitations: Decreased functional mobility , Decreased ADL status, Decreased ROM, Decreased strength, Decreased endurance, Decreased balance, Decreased posture  Assessment: Continued verbalization of L knee pain with ADL's. L knee AROM -5 to 84 degrees this date. Able to B SLS at 20 seconds without LOB or UE support. Reports 0/10 to R hip, decreased L knee pain after treatment to 2/10. Treatment Diagnosis: decreased B LE strength, decreased B SLS stability, decreased L knee AROM, decreased R hip AROM, decreased endurance, gait instability, decreased functional activity tolerance, L knee pain, R hip pain  Prognosis: Good     Goals:  Short term goals  Time Frame for Short term goals: 2 weeks  Short term goal 1: independent with HEP    Long term goals  Time Frame for Long term goals : 6 weeks  Long term goal 1: Improve LEFS score >/=50/80 points to improve functional activity tolerance and return to PLOF(updated 10/21/20)  Long term goal 2: improve Jerad LE MMT scores to at least 4+ to 5/5 in order to return to PLOF work duties(updated 10/21/20)  Long term goal 3: Improve gait to unlimited distances without AD indep with upright posture and minimal deviations(updated 10/21/20)  Long term goal 4: Pt will demo improved R Hip abd, ext, IR, and ER >/=10 deg to increase ease with LE dressing  Long term goal 5: Pt will demo improve L knee flexion AROM >/=90 deg in roder to descend stairs recip indep with improved quality and no evidence of instability  Progress toward goals: L knee AROM -5 to 84    POST-PAIN       Pain Rating (0-10 pain scale):  2 /10 L knee  Location and pain description same as pre-treatment unless indicated.    Action: [x] NA   [] Perform HEP  [] Meds as prescribed  [] Modalities as prescribed   [] Call Physician     Frequency/Duration:  Plan  Times per week: 2  Plan weeks: 6 weeks  Current Treatment Recommendations: Strengthening, ROM, Balance Training, Functional Mobility Training, Endurance Training, Gait Training, Stair training, Neuromuscular Re-education, Manual Therapy - Soft Tissue Mobilization, Manual Therapy - Joint Manipulation, Pain Management, Home Exercise Program, Safety Education & Training, Patient/Caregiver Education & Training, Modalities, Positioning  Plan Comment: transfer to [facility]     Pt to continue current HEP. See objective section for any therapeutic exercise changes, additions or modifications this date.      PT Individual Minutes  Time In: 0413  Time Out: 9353  Minutes: 38  Timed Code Treatment Minutes: 38 Minutes  Procedure Minutes: N/A     Timed Activity Minutes Units   Ther Ex 38 3       Signature:  Electronically signed by Elizabeth Weeks PTA on 10/26/20 at 10:49 AM EDT

## 2020-10-28 ENCOUNTER — HOSPITAL ENCOUNTER (OUTPATIENT)
Dept: PHYSICAL THERAPY | Age: 50
Setting detail: THERAPIES SERIES
Discharge: HOME | End: 2020-10-28
Payer: COMMERCIAL

## 2020-10-28 PROCEDURE — 97110 THERAPEUTIC EXERCISES: CPT

## 2020-10-28 NOTE — PROGRESS NOTES
[address] [address]  Outpatient Physical Therapy    Treatment Note        Date: 10/28/2020  Patient: Ivone Terrell  : 1970  ACCT #: [de-identified]  Referring Practitioner: Rajinder Monroe MD  Diagnosis: unilateral primary osteoarthritis, right hip, Presence of unspecified artificial hip joint (right hip, 2020)    Visit Information:  PT Visit Information  PT Insurance Information: Medical [place]  Total # of Visits to Date: 9  No Show: 0  Canceled Appointment: 0  Progress Note Counter:     Subjective: Pt reports having some soreness in posterior L Hamstring though denies pain in R hip. \"I love my new hip. \"  Comments: RTD 20 with possible RTW after  HEP Compliance:  [x] Good [] Fair [] Poor [] Reports not doing due to:    Vital Signs  Patient Currently in Pain: Denies   Pain Screening  Patient Currently in Pain: Denies    OBJECTIVE:   Exercises  Exercise 1: Step over 8\" hurdles F/L x15 R x10 L focus on SLS and foot clearance  Exercise 2: glut med wall press 5\"x10 lamberto  Exercise 3: wall squats 5\"x15  Exercise 4: NS L 5 x5 min for increased functional hip strength  Exercise 9: pball HSC 10\"x10  Exercise 10: step ups 8\" F/L x20 without UE's  Exercise 11: bridge 5\"[id number] or single leg NV*  Exercise 13: SLS 20\"x3 on blue foam  Exercise 14: TG single leg squat L 7 x10 lamberto  Exercise 15: prone hip ext*  Exercise 17: BOSU lunges x 12 F/L LAMBERTO  Exercise 18: L Heel slides when MD signs of on updated POC*  Exercise 20: HEP: cont current    Strength: [x] NT  [] MMT completed:    ROM: [x] NT  [] ROM measurements:    *Indicates exercise, modality, or manual techniques to be initiated when appropriate    Assessment: Body structures, Functions, Activity limitations: Decreased functional mobility , Decreased ADL status, Decreased ROM, Decreased strength, Decreased endurance, Decreased balance, Decreased posture  Assessment: Progressed wbing ther ex for contd functional LE strength training.  Pt conts to demo decreased functional L knee ROM with activities though is improving. Pt without c/o increased pain thoguh increased fatigue noted thorughout. Treatment Diagnosis: decreased B LE strength, decreased B SLS stability, decreased L knee AROM, decreased R hip AROM, decreased endurance, gait instability, decreased functional activity tolerance, L knee pain, R hip pain  Prognosis: Good     Goals:  Short term goals  Time Frame for Short term goals: 2 weeks  Short term goal 1: independent with HEP    Long term goals  Time Frame for Long term goals : 6 weeks  Long term goal 1: Improve LEFS score >/=50/80 points to improve functional activity tolerance and return to PLOF(updated 10/21/20)  Long term goal 2: improve Jerad LE MMT scores to at least 4+ to 5/5 in order to return to PLOF work duties(updated 10/21/20)  Long term goal 3: Improve gait to unlimited distances without AD indep with upright posture and minimal deviations(updated 10/21/20)  Long term goal 4: Pt will demo improved R Hip abd, ext, IR, and ER >/=10 deg to increase ease with LE dressing  Long term goal 5: Pt will demo improve L knee flexion AROM >/=90 deg in roder to descend stairs recip indep with improved quality and no evidence of instability  Progress toward goals: good    POST-PAIN       Pain Rating (0-10 pain scale):   0/10   Location and pain description same as pre-treatment unless indicated.    Action: [] NA   [x] Perform HEP  [x] Meds as prescribed  [] Modalities as prescribed   [] Call Physician     Frequency/Duration:  Plan  Times per week: 2  Plan weeks: 6 weeks  Current Treatment Recommendations: Strengthening, ROM, Balance Training, Functional Mobility Training, Endurance Training, Gait Training, Stair training, Neuromuscular Re-education, Manual Therapy - Soft Tissue Mobilization, Manual Therapy - Joint Manipulation, Pain Management, Home Exercise Program, Safety Education & Training, Patient/Caregiver Education & Training, Modalities, Positioning  Plan Comment: transfer to [facility] PT     Pt to continue current HEP. See objective section for any therapeutic exercise changes, additions or modifications this date.     PT Individual Minutes  Time In: 6138  Time Out: 1120  Minutes: 40  Timed Code Treatment Minutes: 38 Minutes  Procedure Minutes: 0     Timed Activity Minutes Units   Ther Ex 38 3     Signature:  Electronically signed by Eddie Reyna PT on 10/28/20 at 11:17 AM EDT

## 2020-11-02 ENCOUNTER — HOSPITAL ENCOUNTER (OUTPATIENT)
Dept: PHYSICAL THERAPY | Age: 50
Setting detail: THERAPIES SERIES
Discharge: HOME | End: 2020-11-02
Payer: COMMERCIAL

## 2020-11-02 PROCEDURE — 97110 THERAPEUTIC EXERCISES: CPT

## 2020-11-02 NOTE — PROGRESS NOTES
increased pain throughout. Treatment Diagnosis: decreased B LE strength, decreased B SLS stability, decreased L knee AROM, decreased R hip AROM, decreased endurance, gait instability, decreased functional activity tolerance, L knee pain, R hip pain  Prognosis: Good  Patient Education: light HS stretching and ice or MHP PRN for pain control/mm relaxation    Goals:  Short term goals  Time Frame for Short term goals: 2 weeks  Short term goal 1: independent with HEP    Long term goals  Time Frame for Long term goals : 6 weeks  Long term goal 1: Improve LEFS score >/=50/80 points to improve functional activity tolerance and return to PLOF(updated 10/21/20)  Long term goal 2: improve Jerad LE MMT scores to at least 4+ to 5/5 in order to return to PLOF work duties(updated 10/21/20)  Long term goal 3: Improve gait to unlimited distances without AD indep with upright posture and minimal deviations(updated 10/21/20)  Long term goal 4: Pt will demo improved R Hip abd, ext, IR, and ER >/=10 deg to increase ease with LE dressing  Long term goal 5: Pt will demo improve L knee flexion AROM >/=90 deg in roder to descend stairs recip indep with improved quality and no evidence of instability  Progress toward goals: good despite recent L HS pain     POST-PAIN       Pain Rating (0-10 pain scale): 0  /10 at rest  Location and pain description same as pre-treatment unless indicated.    Action: [] NA   [x] Perform HEP  [] Meds as prescribed  [] Modalities as prescribed   [] Call Physician     Frequency/Duration:  Plan  Times per week: 2  Plan weeks: 6 weeks  Specific instructions for Next Treatment: D/C at end of POC  Current Treatment Recommendations: Strengthening, ROM, Balance Training, Functional Mobility Training, Endurance Training, Gait Training, Stair training, Neuromuscular Re-education, Manual Therapy - Soft Tissue Mobilization, Manual Therapy - Joint Manipulation, Pain Management, Home Exercise Program, Safety Education &

## 2020-11-04 ENCOUNTER — HOSPITAL ENCOUNTER (OUTPATIENT)
Dept: PHYSICAL THERAPY | Age: 50
Setting detail: THERAPIES SERIES
Discharge: HOME | End: 2020-11-04
Payer: COMMERCIAL

## 2020-11-04 NOTE — PROGRESS NOTES
[address]       Physical Therapy  Cancellation/No-show Note  Patient Name:  Aiden Montano  :  1970   Date:  2020  Referring Practitioner: Myriam Reilly MD  Diagnosis: unilateral primary osteoarthritis, right hip, Presence of unspecified artificial hip joint (right hip, 2020)    Visit Information:  PT Visit Information  PT Insurance Information: Medical [place]  Total # of Visits to Date: 10  No Show: 0  Canceled Appointment: 1  Progress Note Counter: 10/12 CX 2020    For today's appointment patient:  [x]  Cancelled  []  Rescheduled appointment  []  No-show   []  Called pt to remind of next appointment     Reason given by patient:  [x]  Patient ill  []  Conflicting appointment  []  No transportation    []  Conflict with work  []  No reason given  []  Other:       Comments:       Signature: Electronically signed by Evita Purdy PTA on 20 at 10:44 AM EST

## 2020-11-09 ENCOUNTER — HOSPITAL ENCOUNTER (OUTPATIENT)
Dept: PHYSICAL THERAPY | Age: 50
Setting detail: THERAPIES SERIES
Discharge: HOME | End: 2020-11-09
Payer: COMMERCIAL

## 2020-11-09 PROCEDURE — 97110 THERAPEUTIC EXERCISES: CPT

## 2020-11-09 NOTE — PROGRESS NOTES
[address] [address]  Outpatient Physical Therapy    Treatment Note        Date: 2020  Patient: Elvina Gaucher  : 1970  ACCT #: [de-identified]  Referring Practitioner: Aamir Chicas MD  Diagnosis: unilateral primary osteoarthritis, right hip, Presence of unspecified artificial hip joint (right hip, 2020)    Visit Information:  PT Visit Information  PT Insurance Information: Medical [place]  Total # of Visits to Date: 6  No Show: 0  Canceled Appointment: 1  Progress Note Counter:     Subjective: Reports 0/10 R hip pain. However, L HS is tender secondary to pulling it several weeks ago. Has neoprene compression sleeve on L HS. HEP Compliance:  [x] Good [] Fair [] Poor [] Reports not doing due to:    Vital Signs  Patient Currently in Pain: No(see above)   Pain Screening  Patient Currently in Pain: No(see above)    OBJECTIVE:   Exercises  Exercise 1: Step over 8\" hurdles F/L x15 R only this date  Exercise 2: glut med wall press 5\"x10 lamberto  Exercise 3: Lamberto HS stretch on steps 30\"x3  Exercise 4: SF L 5 x5 min for increased functional hip strength  Exercise 9: pball HSC 10\"x10  Exercise 10: step ups 8\" F/L x20 without UE's- R only  Exercise 13: SLS 20\"x3 on blue Airex foam R only  Exercise 15: prone hip ext*  Exercise 17: BOSU lunges x 12 F/L LAMBERTO  Exercise 18: L Heel slides when MD signs off on updated POC*  Exercise 20: HEP: cont current    Strength: [x] NT  [] MMT completed:         ROM: [x] NT  [] ROM measurements:     Assessment: Body structures, Functions, Activity limitations: Decreased functional mobility , Decreased ADL status, Decreased ROM, Decreased strength, Decreased endurance, Decreased balance, Decreased posture  Assessment: Inc challeng of uneven surface for strength. Gentle stretching to L HS secondary to soreness. No gait deviation observed in clinid. LEFS score: 57/80 vs 60/80 at IE.   Treatment Diagnosis: decreased B LE strength, decreased B SLS stability, decreased L knee AROM, decreased R hip AROM, decreased endurance, gait instability, decreased functional activity tolerance, L knee pain, R hip pain  Prognosis: Good     Goals:  Short term goals  Time Frame for Short term goals: 2 weeks  Short term goal 1: independent with HEP    Long term goals  Time Frame for Long term goals : 6 weeks  Long term goal 1: Improve LEFS score >/=50/80 points to improve functional activity tolerance and return to PLOF(updated 10/21/20)  Long term goal 2: improve Jerad LE MMT scores to at least 4+ to 5/5 in order to return to PLOF work duties(updated 10/21/20)  Long term goal 3: Improve gait to unlimited distances without AD indep with upright posture and minimal deviations(updated 10/21/20)  Long term goal 4: Pt will demo improved R Hip abd, ext, IR, and ER >/=10 deg to increase ease with LE dressing  Long term goal 5: Pt will demo improve L knee flexion AROM >/=90 deg in roder to descend stairs recip indep with improved quality and no evidence of instability  Progress toward goals: LEFs score: 57/80    POST-PAIN       Pain Rating (0-10 pain scale): 0  /10   Location and pain description same as pre-treatment unless indicated. Action: [x] NA   [] Perform HEP  [] Meds as prescribed  [] Modalities as prescribed   [] Call Physician     Frequency/Duration:  Plan  Times per week: 2  Plan weeks: 6 weeks  Specific instructions for Next Treatment: D/C at end of Next visit  Current Treatment Recommendations: Strengthening, ROM, Balance Training, Functional Mobility Training, Endurance Training, Gait Training, Stair training, Neuromuscular Re-education, Manual Therapy - Soft Tissue Mobilization, Manual Therapy - Joint Manipulation, Pain Management, Home Exercise Program, Safety Education & Training, Patient/Caregiver Education & Training, Modalities, Positioning  Plan Comment: transfer to [facility] PT     Pt to continue current HEP.   See objective section for any therapeutic exercise changes, additions or modifications this date.      PT Individual Minutes  Time In: 1437  Time Out: 7953  Minutes: 31  Timed Code Treatment Minutes: 30 Minutes  Procedure Minutes: N/A     Timed Activity Minutes Units   Ther Ex 30 2       Signature:  Electronically signed by Liat Valle PTA on 11/9/20 at 10:43 AM EST

## 2020-11-11 ENCOUNTER — HOSPITAL ENCOUNTER (OUTPATIENT)
Dept: PHYSICAL THERAPY | Age: 50
Setting detail: THERAPIES SERIES
Discharge: HOME | End: 2020-11-11
Payer: COMMERCIAL

## 2020-11-11 PROCEDURE — 97116 GAIT TRAINING THERAPY: CPT

## 2020-11-11 ASSESSMENT — PAIN SCALES - GENERAL: PAINLEVEL_OUTOF10: 0

## 2020-11-11 NOTE — PROGRESS NOTES
[address] [facility], [address]     Ph: [phone]  Fax: [phone]    [] Certification  [] Recertification []  Plan of Care  [] Progress Note [x] Discharge      To:   Kell Fong MD      From:  Daniel Aguilar PT, DPT   Patient: Chelle Ortiz     : 1970  Diagnosis: unilateral primary osteoarthritis, right hip, Presence of unspecified artificial hip joint (right hip, 2020)     Date: 2020     Progress Report Period from:  10/21/2020  to 2020    Total # of Visits to Date: 12   No Show: 0    Canceled Appointment: 1     OBJECTIVE:   Short Term Goals - Time Frame for Short term goals: 2 weeks    Goals Current/Discharge status  Met   Short term goal 1: independent with HEP  Independent with HEP [x] yes  [] no     Long Term Goals - Time Frame for Long term goals : 6 weeks  Goals Current/ Discharge status Met   Long term goal 1: Improve LEFS score >/=50/80 points to improve functional activity tolerance and return to PLOF(updated 10/21/20) 58/80 LEFS 2020 [x] yes  [] no   Long term goal 2: improve Jerad LE MMT scores to at least 4+ to 5/5 in order to return to PLOF work duties(updated 10/21/20) Strength RLE  Comment: 5/5 hip flexion, knee flexion, knee ext, ankle DF , hip ER/ hip IR, 4-/5 hip ext  Strength LLE  Comment: 5/5 hip flexion, knee flexion, knee ext, ankle DF , hip ER/ hip IR, 4-/5 hip ext [x] yes  [x] no   Long term goal 3: Improve gait to unlimited distances without AD indep with upright posture and minimal deviations(updated 10/21/20)  Ambulation 1  Surface: carpet  Device: No Device  Assistance: Independent  Quality of Gait: slightly improved L knee AROM, WBOS, increased lateral sway, no LOB  Distance: within dept  Comments: no difficulty reported   [x] yes  [] no   Long term goal 4: Pt will demo improved R Hip abd, ext, IR, and ER >/=10 deg to increase ease with LE dressing AROM RLE concerns, please don't hesitate to call. Thank you for your referral.    I have reviewed this plan of care and certify a need for medically necessary rehabilitation services.     Physician Signature:__________________________________________________________  Date:  Please sign and return

## 2020-11-11 NOTE — PROGRESS NOTES
[address] [address]  Outpatient Physical Therapy    Treatment Note        Date: 2020  Patient: Aida Riding  : 1970  ACCT #: [de-identified]  Referring Practitioner: Getachew Nelson MD  Diagnosis: unilateral primary osteoarthritis, right hip, Presence of unspecified artificial hip joint (right hip, 2020)    Visit Information:  PT Visit Information  PT Insurance Information: Medical [place]  Total # of Visits to Date: 15  No Show: 0  Canceled Appointment: 1  Progress Note Counter:     Subjective: I think i am back to normal and i can continue on with therapy at home. i dont have any concerns. HEP Compliance:  [x] Good [] Fair [] Poor [] Reports not doing due to:    Vital Signs  Patient Currently in Pain: No(see above)   Pain Screening  Patient Currently in Pain: No(see above)  Pain Assessment  Pain Assessment: 0-10  Pain Level: 0    OBJECTIVE:             Ambulation 1  Surface: carpet  Device: No Device  Assistance: Independent  Quality of Gait: slightly improved L knee AROM, WBOS, increased lateral sway, no LOB  Distance: within dept  Comments: no difficulty reported         Stairs  # Steps : 12  Stairs Height: 6\"  Rails: None  Device: No Device  Assistance: Modified independent   Comment: recip, no LOB, no instability noted, no difficulty reported    Strength  [x] MMT completed:  Strength RLE  Comment: 5/5 hip flexion, knee flexion, knee ext, ankle DF , hip ER/ hip IR, 4-/5 hip ext  Strength LLE  Comment: 5/5 hip flexion, knee flexion, knee ext, ankle DF , hip ER/ hip IR, 4-/5 hip ext             ROM:   [x] ROM measurements:     AROM RLE (degrees)  RLE General AROM: Hip flex 115 abd 45,IR 50, ER 25, Ext 5, knee 0-104 degrees     AROM LLE (degrees)  LLE General AROM: knee -2-100 supine              Modalities:Declined        *Indicates exercise, modality, or manual techniques to be initiated when appropriate    Assessment:           Assessment:  At this time pt has met most of his goals and continues to progress towards the remaining goals. Pt educated on the importance of continuing HEP upon discharge. Goals:  Short term goals  Time Frame for Short term goals: 2 weeks  Short term goal 1: independent with HEP    Long term goals  Time Frame for Long term goals : 6 weeks  Long term goal 1: Improve LEFS score >/=50/80 points to improve functional activity tolerance and return to PLOF(updated 10/21/20)  Long term goal 2: improve Jerad LE MMT scores to at least 4+ to 5/5 in order to return to PLOF work duties(updated 10/21/20)  Long term goal 3: Improve gait to unlimited distances without AD indep with upright posture and minimal deviations(updated 10/21/20)  Long term goal 4: Pt will demo improved R Hip abd, ext, IR, and ER >/=10 deg to increase ease with LE dressing  Long term goal 5: Pt will demo improve L knee flexion AROM >/=90 deg in roder to descend stairs recip indep with improved quality and no evidence of instability      POST-PAIN       Pain Rating (0-10 pain scale): 0  /10   Location and pain description same as pre-treatment unless indicated. Action: [] NA   [x] Perform HEP  [] Meds as prescribed  [] Modalities as prescribed   [] Call Physician     Frequency/Duration:  Plan  Times per week: 2  Plan weeks: 6 weeks  Specific instructions for Next Treatment: D/C at end of POC  Current Treatment Recommendations: Strengthening, ROM, Balance Training, Functional Mobility Training, Endurance Training, Gait Training, Stair training, Neuromuscular Re-education, Manual Therapy - Soft Tissue Mobilization, Manual Therapy - Joint Manipulation, Pain Management, Home Exercise Program, Safety Education & Training, Patient/Caregiver Education & Training, Modalities, Positioning  Plan Comment: transfer to [facility] PT     Pt to continue current HEP. See objective section for any therapeutic exercise changes, additions or modifications this date.          PT Individual Minutes  Time In:

## 2023-11-20 ENCOUNTER — OFFICE VISIT (OUTPATIENT)
Dept: OTOLARYNGOLOGY | Facility: CLINIC | Age: 53
End: 2023-11-20
Payer: COMMERCIAL

## 2023-11-20 VITALS
TEMPERATURE: 97.2 F | WEIGHT: 261 LBS | RESPIRATION RATE: 18 BRPM | HEART RATE: 80 BPM | DIASTOLIC BLOOD PRESSURE: 98 MMHG | SYSTOLIC BLOOD PRESSURE: 150 MMHG | HEIGHT: 72 IN | OXYGEN SATURATION: 97 % | BODY MASS INDEX: 35.35 KG/M2

## 2023-11-20 DIAGNOSIS — H61.22 EXCESSIVE CERUMEN IN LEFT EAR CANAL: ICD-10-CM

## 2023-11-20 DIAGNOSIS — H93.8X2 SENSATION OF PLUGGED EAR ON LEFT SIDE: Primary | ICD-10-CM

## 2023-11-20 PROCEDURE — 99213 OFFICE O/P EST LOW 20 MIN: CPT | Performed by: NURSE PRACTITIONER

## 2023-11-20 PROCEDURE — 1036F TOBACCO NON-USER: CPT | Performed by: NURSE PRACTITIONER

## 2023-11-20 PROCEDURE — 99203 OFFICE O/P NEW LOW 30 MIN: CPT | Performed by: NURSE PRACTITIONER

## 2023-11-20 RX ORDER — PAROXETINE HYDROCHLORIDE HEMIHYDRATE 25 MG/1
TABLET, FILM COATED, EXTENDED RELEASE ORAL
COMMUNITY
Start: 2023-10-19

## 2023-11-20 RX ORDER — AMLODIPINE AND BENAZEPRIL HYDROCHLORIDE 5; 10 MG/1; MG/1
1 CAPSULE ORAL
COMMUNITY
Start: 2014-01-25

## 2023-11-20 RX ORDER — LORAZEPAM 0.5 MG/1
0.5 TABLET ORAL DAILY PRN
COMMUNITY
Start: 2023-11-04

## 2023-11-20 SDOH — ECONOMIC STABILITY: FOOD INSECURITY: WITHIN THE PAST 12 MONTHS, THE FOOD YOU BOUGHT JUST DIDN'T LAST AND YOU DIDN'T HAVE MONEY TO GET MORE.: NEVER TRUE

## 2023-11-20 SDOH — ECONOMIC STABILITY: FOOD INSECURITY: WITHIN THE PAST 12 MONTHS, YOU WORRIED THAT YOUR FOOD WOULD RUN OUT BEFORE YOU GOT MONEY TO BUY MORE.: NEVER TRUE

## 2023-11-20 ASSESSMENT — PATIENT HEALTH QUESTIONNAIRE - PHQ9
2. FEELING DOWN, DEPRESSED OR HOPELESS: NOT AT ALL
1. LITTLE INTEREST OR PLEASURE IN DOING THINGS: NOT AT ALL
SUM OF ALL RESPONSES TO PHQ9 QUESTIONS 1 & 2: 0

## 2023-11-20 ASSESSMENT — COLUMBIA-SUICIDE SEVERITY RATING SCALE - C-SSRS
2. HAVE YOU ACTUALLY HAD ANY THOUGHTS OF KILLING YOURSELF?: NO
6. HAVE YOU EVER DONE ANYTHING, STARTED TO DO ANYTHING, OR PREPARED TO DO ANYTHING TO END YOUR LIFE?: NO
1. IN THE PAST MONTH, HAVE YOU WISHED YOU WERE DEAD OR WISHED YOU COULD GO TO SLEEP AND NOT WAKE UP?: NO

## 2023-11-20 NOTE — PROGRESS NOTES
Subjective   Patient ID: Moshe Bryant is a 53 y.o. male who presents for New Patient Visit (ears).    HPI  He is here for evaluation of his left ear. He felt like there was water in the ear and he couldn't here. It could get worse as the day goes on. Went to PCP and was treated with ear drops for an ear infection. It didn't improve. Went back then got an oral antibiotic and steroids. Then got more antibiotics. Some ear pain, but not much. He hears stuff swishing around in there. Denies dizziness. No ear surgeries.     There is no problem list on file for this patient.    History reviewed. No pertinent surgical history.    Review of Systems    All other systems have been reviewed and are negative for complaints except for those mentioned in history of present illness, past medical history and problem list.    Objective   Physical Exam    Constitutional: No fever, chills, weight loss or weight gain  General appearance: Appears well, well-nourished, well groomed. No acute distress.    Communication: Normal communication    Psychiatric: Oriented to person, place and time. Normal mood and affect.    Neurologic: Cranial nerves II-XII grossly intact and symmetric bilaterally.    Head and Face:  Head: Atraumatic with no masses, lesions or scarring.  Face: Normal symmetry. No scars or deformities.  TMJ: Normal, no trismus.    Eyes: Conjunctiva not edematous or erythematous. PERRLA    Right Ear: External inspection of ear with no deformity, scars, or masses. EAC is clear.  TM is intact with no sign of infection, effusion, or retraction.  No perforation seen.     Left Ear: External inspection of ear with no deformity, scars, or masses. EAC is clear.  There is a hard crust of cerumen adherent to the TM.  This was removed using sterile water to help soften the cerumen and to suction and alligator forceps.  After removal, the TM is slightly inflamed but there is no evidence of infection.    Nose: External inspection of nose: No  nasal lesions, lacerations or scars. Anterior rhinoscopy with limited visualization past the inferior turbinates. No tenderness on frontal or maxillary sinus palpation.    Oral Cavity/Mouth: Oral cavity and oropharynx mucosa moist and pink. No lesions or masses. Dentition normal. Tonsils appear normal. Uvula is midline. Tongue with no masses or lesions. Tongue with good mobility. The oropharynx is clear.    Neck: Normal appearing, symmetric, trachea midline.     Cardiovascular: Examination of peripheral vascular system shows no clubbing or cyanosis.    Respiratory: No respiratory distress increased work of breathing. Inspection of the chest with symmetric chest expansion and normal respiratory effort.    Skin: No head and neck rashes.    Lymph nodes: No adenopathy.     Assessment/Plan   Diagnoses and all orders for this visit:  Sensation of plugged ear on left side  Excessive cerumen in left ear canal  The cerumen was removed from the left TM which is likely causing patient's symptoms.  Reassurance given.  I do recommend using some of the eardrops he has at home for the next few days due to the TM inflammation.  He may follow-up as needed.  All questions answered to patient's satisfaction.    All questions answered to patient's satisfaction.    This note was created using speech recognition transcription software. Despite proofreading, several typographical errors might be present that might affect the meaning of the content. Please call with any questions.

## 2024-01-17 ENCOUNTER — APPOINTMENT (OUTPATIENT)
Dept: OTOLARYNGOLOGY | Facility: CLINIC | Age: 54
End: 2024-01-17
Payer: COMMERCIAL

## 2024-01-19 ENCOUNTER — OFFICE VISIT (OUTPATIENT)
Dept: OTOLARYNGOLOGY | Facility: CLINIC | Age: 54
End: 2024-01-19
Payer: COMMERCIAL

## 2024-01-19 ENCOUNTER — CLINICAL SUPPORT (OUTPATIENT)
Dept: AUDIOLOGY | Facility: CLINIC | Age: 54
End: 2024-01-19
Payer: COMMERCIAL

## 2024-01-19 DIAGNOSIS — H93.8X2 EAR FULLNESS, LEFT: Primary | ICD-10-CM

## 2024-01-19 DIAGNOSIS — Z01.10 ENCOUNTER FOR HEARING EXAMINATION WITHOUT ABNORMAL FINDINGS: Primary | ICD-10-CM

## 2024-01-19 DIAGNOSIS — H93.292 ABNORMAL AUDITORY PERCEPTION OF LEFT EAR: ICD-10-CM

## 2024-01-19 PROCEDURE — 92567 TYMPANOMETRY: CPT | Performed by: AUDIOLOGIST

## 2024-01-19 PROCEDURE — 92557 COMPREHENSIVE HEARING TEST: CPT | Performed by: AUDIOLOGIST

## 2024-01-19 PROCEDURE — 99203 OFFICE O/P NEW LOW 30 MIN: CPT | Performed by: OTOLARYNGOLOGY

## 2024-01-19 PROCEDURE — 1036F TOBACCO NON-USER: CPT | Performed by: OTOLARYNGOLOGY

## 2024-01-19 ASSESSMENT — ENCOUNTER SYMPTOMS
RESPIRATORY NEGATIVE: 1
VOMITING: 0
HEADACHES: 0
NECK PAIN: 0
RHINORRHEA: 0
NEUROLOGICAL NEGATIVE: 1
CONSTITUTIONAL NEGATIVE: 1
CARDIOVASCULAR NEGATIVE: 1
DIARRHEA: 0
COUGH: 0
SORE THROAT: 0
ABDOMINAL PAIN: 0

## 2024-01-19 NOTE — PROGRESS NOTES
Mr. Bryant, age 53, was seen today for a hearing evaluation during his ENT visit with Dr. Rothman due to concern for his left ear intermittently feeling full and clogged.    Results:  Otoscopy revealed clear ear canals and tympanic membranes were visualized bilaterally.  Tympanometry revealed normal, Type A tympanograms, indicating normal ear canal volume, peak pressure and compliance bilaterally.  Audiometric thresholds revealed normal hearing sensitivity bilaterally.  Word recognition scores were excellent bilaterally.    Recommendations:  Follow-up with PCP, Dr. Hall, as medically directed.  Follow-up with ENT, Dr. Rothman, as medically directed.  Retest hearing as needed or should concern for a change in hearing arise.

## 2024-01-19 NOTE — PROGRESS NOTES
Subjective   Patient ID: Moshe Bryant is a 53 y.o. male who presents for evaluation of his left ear.    HPI  Patient for the last year or so has had evidence of a intermittent fullness sensation where the ear feels clogged.  It may fluctuate during the day.  It seems to be worse in the morning.  He denies any other worrisome issues.  He denies any allegra change in hearing.  No vertigo.  No obvious concerning tinnitus.  No headache phenomenon.  Remaining ENT inquiry is otherwise grossly clear.  No specific alleviating/aggravating factors.  He does have a high intake of dietary chocolate.  Moderate stress.    Medication and allergy status reviewed.    Review of Systems   Constitutional: Negative.    HENT:  Positive for ear pain. Negative for ear discharge, hearing loss, rhinorrhea and sore throat.    Respiratory: Negative.  Negative for cough.    Cardiovascular: Negative.    Gastrointestinal:  Negative for abdominal pain, diarrhea and vomiting.   Musculoskeletal:  Negative for neck pain.   Skin:  Negative for rash.   Neurological: Negative.  Negative for headaches.       Physical Exam  General appearance: No acute distress. Normal facies. Symmetric facial movement. No gross lesions of the face are noted.  The external ear structures appear normal. The ear canals patent and the tympanic membranes are intact without evidence of air-fluid levels, retraction, or congenital defects.  Anterior rhinoscopy notes essentially a midline nasal septum. Examination is noted for normal healthy mucosal membranes without any evidence of lesions, polyps, or exudate. The tongue is normally mobile. There are no lesions on the gingiva, buccal, or oral mucosa. There are no oral cavity masses.  The neck is negative for mass lymphadenopathy. The trachea and parotid are clear. The thyroid bed is grossly unremarkable. The salivary gland structures are grossly unremarkable.    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  At this juncture it makes the most sense in particular this high dietary intake of chocolate in conjunction with stress and the nature of his fluctuation etc.  Detailed discussion with him in this regard and asked him to significantly reduce his dietary intake of chocolate to see if that helps.  We have otherwise reassured him that the exam and audiogram are all completely clear.  He will update me as needed.  All questions were answered in this regard accordingly.    Thank you again for allowing us to participate in the care of this patient.    This note was created with voice recognition software and has not been corrected for typographical or grammatical errors.

## 2024-07-29 ENCOUNTER — OFFICE VISIT (OUTPATIENT)
Dept: ORTHOPEDIC SURGERY | Facility: CLINIC | Age: 54
End: 2024-07-29
Payer: COMMERCIAL

## 2024-07-29 ENCOUNTER — HOSPITAL ENCOUNTER (OUTPATIENT)
Dept: RADIOLOGY | Facility: HOSPITAL | Age: 54
Discharge: HOME | End: 2024-07-29
Payer: COMMERCIAL

## 2024-07-29 DIAGNOSIS — M25.562 ACUTE PAIN OF LEFT KNEE: ICD-10-CM

## 2024-07-29 DIAGNOSIS — S83.92XA SPRAIN OF LEFT KNEE, INITIAL ENCOUNTER: Primary | ICD-10-CM

## 2024-07-29 PROCEDURE — 1036F TOBACCO NON-USER: CPT | Performed by: STUDENT IN AN ORGANIZED HEALTH CARE EDUCATION/TRAINING PROGRAM

## 2024-07-29 PROCEDURE — 73564 X-RAY EXAM KNEE 4 OR MORE: CPT | Mod: LEFT SIDE | Performed by: RADIOLOGY

## 2024-07-29 PROCEDURE — 99204 OFFICE O/P NEW MOD 45 MIN: CPT | Performed by: STUDENT IN AN ORGANIZED HEALTH CARE EDUCATION/TRAINING PROGRAM

## 2024-07-29 PROCEDURE — L1812 KO ELASTIC W/JOINTS PRE OTS: HCPCS | Performed by: STUDENT IN AN ORGANIZED HEALTH CARE EDUCATION/TRAINING PROGRAM

## 2024-07-29 PROCEDURE — 73564 X-RAY EXAM KNEE 4 OR MORE: CPT | Mod: LT

## 2024-07-29 RX ORDER — METHYLPREDNISOLONE 4 MG/1
TABLET ORAL
Qty: 1 EACH | Refills: 0 | Status: SHIPPED | OUTPATIENT
Start: 2024-07-29

## 2024-07-29 NOTE — PROGRESS NOTES
Acute Injury New Patient Visit    HPI: Moshe is a 54 y.o.male who presents today with new complaints of left knee pain.  Of note, he is a patient of Dr. Lewis Dave, last seen about 3-1/2 years ago for a right hip replacement.  States that this has been ongoing for about 1 to 2 weeks.  He denies any falls or injuries.  The knee has had some swelling and warmth associated with it, but no erythema or bruising.  States that he was having some pain in his right foot.  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  He feels as though he has been compensating because of the pain in the right foot.  His original surgery was in 2014 with Dr. Moshe Laughlin.  He has been trying ice and ibuprofen.  He takes meloxicam daily as well.  He denies any history of diabetes, blood thinners, or stomach ulcers.    Plan: For this left knee sprain, we will fit him with a hinged knee brace, start a Medrol Dosepak, have him hold his NSAIDs until he is finished with his steroid, and follow-up with Dr. Dave in the next 1 to 2 weeks.    Assessment:   Problem List Items Addressed This Visit    None  Visit Diagnoses       Sprain of left knee, initial encounter    -  Primary    Relevant Medications    methylPREDNISolone (Medrol Dospak) 4 mg tablets    Other Relevant Orders    Knee Brace, Hinged    Acute pain of left knee        Relevant Medications    methylPREDNISolone (Medrol Dospak) 4 mg tablets    Other Relevant Orders    XR knee left 4+ views    Knee Brace, Hinged            Diagnostics: X-rays of the left knee reveal hardware seems to be intact with no signs of loosening or periprosthetic fracture.      Procedure:  Procedures    Physical Exam:  GENERAL:  No obvious acute distress.  NEURO:  Distally neurovascularly intact.  Sensation intact to light touch.  Extremity: Left knee examination shows:  Skin is intact;  No erythema, but minimal  warmth;  Possible mild swelling, but not significantly noticeable compared to the right knee;  No effusion;  Can flex the left knee to 90 degrees;  Full extension at 0 degrees;  TENDER over the medial joint line;  No pain over the lateral joint line;  No pain over the patellar or quadricep tendon;  No pain over the proximal tibia;  No pain over the popliteal fossa;  POSITIVE valgus stress test;  Negative varus stress test;  Negative Dorene's test medially with no instability;  Negative Dorene's test laterally with no instability;  Negative Lachman's test;  Patellar and quadricep mechanism intact;  Negative anterior and posterior drawer test;  Negative patellar apprehension test;  Distal pulses are palpable;  Neurovascularly intact; and  Walking with no significant antalgic gait.      Orders Placed This Encounter    Knee Brace, Hinged    XR knee left 4+ views    methylPREDNISolone (Medrol Dospak) 4 mg tablets      At the conclusion of the visit there were no further questions by the patient/family regarding their plan of care.  Patient was instructed to call or return with any issues, questions, or concerns regarding their injury and/or treatment plan described above.     07/29/24 at 10:09 AM - Eliot Colon DO    Office: [phone]    This note was prepared using voice recognition software.  The details of this note are correct and have been reviewed, and corrected to the best of my ability.  Some grammatical errors may persist related to the Dragon software.

## 2024-08-07 ENCOUNTER — APPOINTMENT (OUTPATIENT)
Dept: ORTHOPEDIC SURGERY | Facility: CLINIC | Age: 54
End: 2024-08-07
Payer: COMMERCIAL

## 2024-08-07 DIAGNOSIS — S83.92XA SPRAIN OF LEFT KNEE, INITIAL ENCOUNTER: Primary | ICD-10-CM

## 2024-08-07 PROCEDURE — 99213 OFFICE O/P EST LOW 20 MIN: CPT | Performed by: ORTHOPAEDIC SURGERY

## 2024-08-07 PROCEDURE — 1036F TOBACCO NON-USER: CPT | Performed by: ORTHOPAEDIC SURGERY

## 2024-08-08 NOTE — PROGRESS NOTES
New patient to me 54-year-old gentleman presents complaining of left-sided knee pain states he has been having trouble with his left knee for the past month or so does not recall any specific episode or injury to his left knee but he did have an injury to his right foot and he feels like he may have been overstressing his left knee because he was limping because of his right foot is actually feeling much better now both his knee and his foot but he wanted to get things checked out he did have a Medrol Dosepak and feels like that gave him some improvement as well.    Location of pain: Anterior aspect knee left  Quality of pain: Significant pain a few weeks ago but feeling much better now  Modifying factors: Worse with weightbearing getting up and down from seated position better with rest  Associated signs and symptoms: No numbness or tingling some swelling  Previous treatment: Had a Medrol Dosepak which seemed to help with the symptoms also with meloxicam and is tried a knee s brace for support        The patient's past medical history, family history, social history, and review of systems were documented on the patient's medical intake form.  The medical intake form was reviewed and scanned into the electronic medical record for future use.  History is otherwise negative except as stated in the HPI.    Physical exam    General: Alert and oriented to place, person, and time.  No acute distress and breathing comfortably; pleasant and cooperative with the examination.  HEENT: Head is normocephalic and atraumatic.  Neck: Supple, no visible swelling.  Cardiovascular: Good perfusion to the affected extremity.  Lungs: No audible wheezing or labored breathing.  Abdomen: Nondistended  HEME/Lymph : No visible abnormalities bilateral lower extremity    Extremity:  The affected knee was examined and inspected and was tender to touch along the medial aspect.  The patient has catching/locking and occasional mechanical  symptoms.  The skin was intact without breakdown or open wounds.  There was a mild Dorene exam seen with mild evidence of instability and weakness in the collateral ligaments with laxity to varus valgus stress and in the anterior posterior plane.  There was a negative Lachman's test, pivot shift test, and posterior drawer sign.  There was no foot drop, numbness or tingling.  Sensation, reflexes, and pulses in the foot and ankle were present.  There was an effusion but range of motion was good and straight leg raise testing was normal.   The patient had the ability to bear weight but with discomfort.  The patient's gait was antalgic secondary to the discomfort. Knee range of motion was 0-115    Diagnostics:      XR knee left 4+ views    Result Date: 7/30/2024  Interpreted By:  Abigail Chapin, STUDY: Left knee, four views.   INDICATION: Signs/Symptoms:Pain   COMPARISON: None.   ACCESSION NUMBER(S): [id number]   ORDERING CLINICIAN: ROMÁN ANDRADE   FINDINGS: No acute fracture or malalignment.   Patient is status post left total knee arthroplasty. Hardware is intact without perihardware fractures or lucencies. Alignment is anatomic. No significant knee joint effusion.       1. Left total knee arthroplasty without hardware complication. 2. No acute fracture or malalignment   MACRO: None.   Signed by: Abigail Chapin 7/30/2024 7:15 PM Dictation workstation:   [id number]         Procedures  [none   ]    Assessment:  Strain sprain left knee    Treatment plan:  1.  The natural history of the condition and its associated treatment alternatives including surgical and nonsurgical options were discussed with the patient at length.  2.  Patient seems to be feeling much better now he is going to continue with his knee brace on an as-needed basis continue with the meloxicam as needed and follow-up with me in a month if he still having symptoms otherwise as needed.  3. [   ]  4.  All of the patient's questions were  answered.    No orders of the defined types were placed in this encounter.      This note was prepared using voice recognition software.  The details of this note are correct and have been reviewed, and corrected to the best of my ability.  Some grammatical areas may persist related to the Dragon software    Lewis Dave MD  Senior Attending Physician  [facility]  [facility]    [phone]

## 2024-09-04 ENCOUNTER — APPOINTMENT (OUTPATIENT)
Dept: ORTHOPEDIC SURGERY | Facility: CLINIC | Age: 54
End: 2024-09-04
Payer: COMMERCIAL

## 2024-09-10 ENCOUNTER — APPOINTMENT (OUTPATIENT)
Dept: ORTHOPEDIC SURGERY | Facility: CLINIC | Age: 54
End: 2024-09-10
Payer: COMMERCIAL

## 2025-02-12 ENCOUNTER — OFFICE VISIT (OUTPATIENT)
Dept: URGENT CARE | Age: 55
End: 2025-02-12
Payer: COMMERCIAL

## 2025-02-12 VITALS
BODY MASS INDEX: 36.57 KG/M2 | DIASTOLIC BLOOD PRESSURE: 83 MMHG | HEART RATE: 67 BPM | SYSTOLIC BLOOD PRESSURE: 154 MMHG | HEIGHT: 72 IN | WEIGHT: 270 LBS | TEMPERATURE: 97.3 F | OXYGEN SATURATION: 94 % | RESPIRATION RATE: 18 BRPM

## 2025-02-12 DIAGNOSIS — J06.9 UPPER RESPIRATORY TRACT INFECTION, UNSPECIFIED TYPE: ICD-10-CM

## 2025-02-12 DIAGNOSIS — J40 BRONCHITIS: Primary | ICD-10-CM

## 2025-02-12 RX ORDER — PREDNISONE 20 MG/1
20 TABLET ORAL 2 TIMES DAILY
Qty: 10 TABLET | Refills: 0 | Status: SHIPPED | OUTPATIENT
Start: 2025-02-12 | End: 2025-02-17

## 2025-02-12 RX ORDER — AZITHROMYCIN 250 MG/1
TABLET, FILM COATED ORAL
Qty: 6 TABLET | Refills: 0 | Status: SHIPPED | OUTPATIENT
Start: 2025-02-12

## 2025-02-12 RX ORDER — BROMPHENIRAMINE MALEATE, PSEUDOEPHEDRINE HYDROCHLORIDE, AND DEXTROMETHORPHAN HYDROBROMIDE 2; 30; 10 MG/5ML; MG/5ML; MG/5ML
5 SYRUP ORAL EVERY 4 HOURS PRN
Qty: 120 ML | Refills: 0 | Status: SHIPPED | OUTPATIENT
Start: 2025-02-12

## 2025-02-12 NOTE — PROGRESS NOTES
"Subjective   Patient ID: Moshe Bryant is a 54 y.o. male who presents for Cough (Cough with congestion in lungs, x5 days. Taking cough medicine without relief. Pt took \"leftover antibiotics\". ).  HPI  Presents for evaluation of URI.  Symptoms including cough, congestion,  have been present for 5 days and refractory to OTC meds.  No fever, chills, nausea, vomiting, abdominal pain, CP, or SOB.  No exacerbating factors.  No other complaints.    Review of Systems    Constitutional:  See HPI   ENT: See HPI  Respiratory: See HPI  Neurologic:  Alert and oriented X4, No numbness, No tingling.    All other systems are negative     Objective     /83   Pulse 67   Temp 36.3 °C (97.3 °F) (Temporal)   Resp 18   Ht 1.829 m (6')   Wt 122 kg (270 lb)   SpO2 94%   BMI 36.62 kg/m²     Physical Exam    General:  Alert and oriented, No acute distress.    Eye:  Pupils are equal, round and reactive to light, Normal conjunctiva.    HENT:  Normocephalic, unremarkable oropharynx; no cervical adenopathy; no sinus tenderness  Neck:  Supple    Respiratory: Respirations are non-labored; bilateral scattered wheezing and rhonchi; no rales  Musculoskeletal: Normal ROM and strength  Integumentary:  Warm, Dry, Intact, No pallor, No rash.    Neurologic:  Alert, Oriented, Normal sensory, Cranial Nerves II-XII are grossly intact  Psychiatric:  Cooperative, Appropriate mood & affect.    Assessment/Plan   Exam revealed bronchitis and upper respiratory infection.  Prescriptions for Z-Matthew, prednisone, and Bromfed.  Patient's clinical presentation is otherwise unremarkable at this time. Patient is discharged with instructions to follow-up with primary care or seek emergency medical attention for worsening symptoms or any new concerns.  Problem List Items Addressed This Visit    None  Visit Diagnoses       Bronchitis    -  Primary    Relevant Medications    brompheniramine-pseudoeph-DM (Bromfed DM) 2-30-10 mg/5 mL syrup    azithromycin " (Zithromax) 250 mg tablet    predniSONE (Deltasone) 20 mg tablet    Upper respiratory tract infection, unspecified type        Relevant Medications    brompheniramine-pseudoeph-DM (Bromfed DM) 2-30-10 mg/5 mL syrup    azithromycin (Zithromax) 250 mg tablet    predniSONE (Deltasone) 20 mg tablet            Final diagnoses:   [J40] Bronchitis   [J06.9] Upper respiratory tract infection, unspecified type

## 2025-07-07 ENCOUNTER — OFFICE VISIT (OUTPATIENT)
Dept: ORTHOPEDIC SURGERY | Facility: CLINIC | Age: 55
End: 2025-07-07
Payer: COMMERCIAL

## 2025-07-07 ENCOUNTER — HOSPITAL ENCOUNTER (OUTPATIENT)
Dept: RADIOLOGY | Facility: HOSPITAL | Age: 55
Discharge: HOME | End: 2025-07-07
Payer: COMMERCIAL

## 2025-07-07 DIAGNOSIS — S83.92XA SPRAIN OF LEFT KNEE, INITIAL ENCOUNTER: ICD-10-CM

## 2025-07-07 DIAGNOSIS — M25.562 ACUTE PAIN OF LEFT KNEE: ICD-10-CM

## 2025-07-07 PROCEDURE — 73560 X-RAY EXAM OF KNEE 1 OR 2: CPT | Mod: LT

## 2025-07-07 PROCEDURE — 73560 X-RAY EXAM OF KNEE 1 OR 2: CPT | Mod: LEFT SIDE | Performed by: RADIOLOGY

## 2025-07-07 PROCEDURE — 99214 OFFICE O/P EST MOD 30 MIN: CPT | Performed by: STUDENT IN AN ORGANIZED HEALTH CARE EDUCATION/TRAINING PROGRAM

## 2025-07-07 RX ORDER — METHYLPREDNISOLONE 4 MG/1
TABLET ORAL
Qty: 1 EACH | Refills: 0 | Status: SHIPPED | OUTPATIENT
Start: 2025-07-07

## 2025-07-07 NOTE — PROGRESS NOTES
Acute Injury Established Patient Visit    Patient ID: Moshe Bryant is a 55 y.o. male  History of Present Illness  The patient is a 55-year-old male who presents for left knee pain and swelling.    He was last seen at the end of July 2024 for similar complaints and responded well to a Medrol Dosepak. He followed up with Dr. Dave and reported feeling better at that time. He reports an incident where he experienced a sharp pain in his left knee while walking down a hill after carrying a case of water. This was followed by swelling in the knee. He has not had any recent falls or injuries. The pain is particularly severe when he attempts to bend the knee, and he notes a decrease in his range of motion compared to before. Morning stiffness is so severe that it impedes his ability to walk. He has been using a brace for support but removed the hinges from it. He is currently taking meloxicam for pain management. He is considering physical therapy as a potential treatment option. He has not had any recent steroid treatments.    He is currently on doxycycline for a tick bite and has 2 doses remaining.    PAST SURGICAL HISTORY:  He has previously undergone knee surgery performed by Dr. Laughlin in 2014. Dr. Dave performed hip surgery.    SOCIAL HISTORY  Exercise: Active person       Assessment & Plan  1. Left knee sprain:  The x-ray results are satisfactory, showing no fractures or other abnormalities. The hardware in the knee appears to be functioning well. There is no evidence of infection or gout. The symptoms suggest a possible ligament sprain.    A steroid pack will be prescribed for symptom management. Use the brace with hinges for additional support. If the brace or hinges are not available or do not function properly, return for a fitting. Continue taking meloxicam, but pause its use while on the steroid treatment and resume it afterwards, ensuring it is taken with food to protect the stomach. A referral to  Dr. Dave will be made for further evaluation.    Follow-up: 2 to 3 weeks with Dr. Dave.       Assessment:   Problem List Items Addressed This Visit    None  Visit Diagnoses         Acute pain of left knee        Relevant Orders    XR knee left 1-2 views      Sprain of left knee, initial encounter        Relevant Medications    methylPREDNISolone (Medrol Dospak) 4 mg tablets            Diagnostics: Reviewed all relevant imaging including x-ray, MRI, CT, and US.    Results  Imaging   - X-ray of the left knee: 07/07/2025, No significant changes from last year, no broken bones, and hardware appears intact.       Procedure:  Procedures    Physical Exam  Musculoskeletal:  Gait: Patient walks with a flat-footed gait and reports pain when pushing off.  Left knee: Swelling noted. No redness, warmth, or signs of infection. Limited range of motion observed. Stable on examination.       Orders Placed This Encounter    XR knee left 1-2 views    methylPREDNISolone (Medrol Dospak) 4 mg tablets      At the conclusion of the visit there were no further questions by the patient/family regarding their plan of care.  Patient was instructed to call or return with any issues, questions, or concerns regarding their injury and/or treatment plan described above.     07/07/25 at 9:09 AM - Eliot Colon DO    Office: [phone]    This note was prepared using voice recognition software.  The details of this note are correct and have been reviewed, and corrected to the best of my ability.  Some grammatical errors may persist related to the Dragon software.  This medical note was created with the assistance of artificial intelligence (AI) for documentation purposes. The content has been reviewed and confirmed by the healthcare provider for accuracy and completeness. Patient consented to the use of audio recording and use of AI during their visit.

## 2025-07-28 ENCOUNTER — HOSPITAL ENCOUNTER (OUTPATIENT)
Dept: WOUND CARE | Age: 55
Discharge: HOME | End: 2025-07-28
Payer: COMMERCIAL

## 2025-07-28 VITALS
RESPIRATION RATE: 16 BRPM | SYSTOLIC BLOOD PRESSURE: 139 MMHG | DIASTOLIC BLOOD PRESSURE: 99 MMHG | TEMPERATURE: 97 F | HEART RATE: 111 BPM

## 2025-07-28 DIAGNOSIS — S61.412A LACERATION OF LEFT HAND WITHOUT FOREIGN BODY, INITIAL ENCOUNTER: Primary | ICD-10-CM

## 2025-07-28 PROCEDURE — 99213 OFFICE O/P EST LOW 20 MIN: CPT

## 2025-07-28 PROCEDURE — 99203 OFFICE O/P NEW LOW 30 MIN: CPT

## 2025-07-28 RX ORDER — LIDOCAINE HYDROCHLORIDE 20 MG/ML
JELLY TOPICAL PRN
OUTPATIENT
Start: 2025-07-28

## 2025-07-28 RX ORDER — GINSENG 100 MG
CAPSULE ORAL PRN
OUTPATIENT
Start: 2025-07-28

## 2025-07-28 RX ORDER — LIDOCAINE HYDROCHLORIDE 40 MG/ML
SOLUTION TOPICAL PRN
OUTPATIENT
Start: 2025-07-28

## 2025-07-28 RX ORDER — SILVER SULFADIAZINE 10 MG/G
CREAM TOPICAL PRN
OUTPATIENT
Start: 2025-07-28

## 2025-07-28 RX ORDER — MELOXICAM 15 MG/1
TABLET ORAL
COMMUNITY

## 2025-07-28 RX ORDER — BETAMETHASONE DIPROPIONATE 0.5 MG/G
CREAM TOPICAL PRN
OUTPATIENT
Start: 2025-07-28

## 2025-07-28 RX ORDER — MUPIROCIN 2 %
OINTMENT (GRAM) TOPICAL PRN
OUTPATIENT
Start: 2025-07-28

## 2025-07-28 RX ORDER — GENTAMICIN SULFATE 1 MG/G
OINTMENT TOPICAL PRN
OUTPATIENT
Start: 2025-07-28

## 2025-07-28 RX ORDER — TRIAMCINOLONE ACETONIDE 1 MG/G
OINTMENT TOPICAL PRN
OUTPATIENT
Start: 2025-07-28

## 2025-07-28 RX ORDER — BACITRACIN ZINC AND POLYMYXIN B SULFATE 500; 1000 [USP'U]/G; [USP'U]/G
OINTMENT TOPICAL PRN
OUTPATIENT
Start: 2025-07-28

## 2025-07-28 RX ORDER — LIDOCAINE 50 MG/G
OINTMENT TOPICAL PRN
OUTPATIENT
Start: 2025-07-28

## 2025-07-28 RX ORDER — PAROXETINE 10 MG/1
10 TABLET, FILM COATED ORAL EVERY MORNING
COMMUNITY

## 2025-07-28 RX ORDER — CLOBETASOL PROPIONATE 0.5 MG/G
OINTMENT TOPICAL PRN
OUTPATIENT
Start: 2025-07-28

## 2025-07-28 RX ORDER — SODIUM CHLOR/HYPOCHLOROUS ACID 0.033 %
SOLUTION, IRRIGATION IRRIGATION PRN
OUTPATIENT
Start: 2025-07-28

## 2025-07-28 RX ORDER — LIDOCAINE 40 MG/G
CREAM TOPICAL PRN
OUTPATIENT
Start: 2025-07-28

## 2025-07-28 RX ORDER — NEOMYCIN/BACITRACIN/POLYMYXINB 3.5-400-5K
OINTMENT (GRAM) TOPICAL PRN
OUTPATIENT
Start: 2025-07-28

## 2025-07-28 RX ORDER — AMLODIPINE AND BENAZEPRIL HYDROCHLORIDE 10; 20 MG/1; MG/1
1 CAPSULE ORAL DAILY
COMMUNITY

## 2025-07-28 NOTE — DISCHARGE INSTRUCTIONS
[facility]   Physician Orders and Discharge Instructions  [id number] [place]  [address]  Telephone: [phone]      [phone]      NAME:  Shade Chowdary          YOB: 1970  MEDICAL RECORD NUMBER:  [id number]    Your  is:  YokastaDyanMarleny    Home Care/Facility: None    Wound Location: Left Hand    Dressing orders: Steri-strips and dry dressing applied today.  If the Steri-strips fall off and apply a dry dressing - call and Sandra will call you in an Antibiotic ointment    Compression: None    Offloading Device:    Other Instructions: Try to keep the wound area dry.    Keep all dressings clean, dry and intact.  Keep pressure off the wound(s) at all times.     Follow up visit   1 Week August 4, 2025 @ 1:00    Please give 24 hour notice if unable to keep appointment. [phone]    If you experience any of the following, please call the Wound Care Service at  [phone] or go to the nearest emergency room.   *Increase in pain *Temperature over 101 *Increase in drainage from your wound or a foul odor  *Uncontrolled swelling *Need for compression bandage changes due to slippage, breakthrough drainage       PLEASE NOTE: IF YOU ARE UNABLE TO OBTAIN WOUND SUPPLIES, CONTINUE TO USE THE SUPPLIES YOU HAVE AVAILABLE UNTIL YOU ARE ABLE TO REACH US. IT IS MOST IMPORTANT TO KEEP THE WOUND COVERED AT ALL TIMES          Electronically signed by MINDI Martinez CNP on 7/28/2025 at 1:33 PM

## 2025-07-30 ENCOUNTER — APPOINTMENT (OUTPATIENT)
Dept: ORTHOPEDIC SURGERY | Facility: CLINIC | Age: 55
End: 2025-07-30
Payer: COMMERCIAL

## 2025-08-04 ENCOUNTER — HOSPITAL ENCOUNTER (OUTPATIENT)
Dept: WOUND CARE | Age: 55
Discharge: HOME | End: 2025-08-04
Payer: COMMERCIAL

## 2025-08-04 VITALS
RESPIRATION RATE: 16 BRPM | HEART RATE: 78 BPM | SYSTOLIC BLOOD PRESSURE: 124 MMHG | DIASTOLIC BLOOD PRESSURE: 85 MMHG | TEMPERATURE: 97.6 F

## 2025-08-04 DIAGNOSIS — S61.412A LACERATION OF LEFT HAND WITHOUT FOREIGN BODY, INITIAL ENCOUNTER: Primary | ICD-10-CM

## 2025-08-04 PROCEDURE — 99213 OFFICE O/P EST LOW 20 MIN: CPT

## 2025-08-04 PROCEDURE — 99212 OFFICE O/P EST SF 10 MIN: CPT

## 2025-08-04 RX ORDER — BACITRACIN ZINC AND POLYMYXIN B SULFATE 500; 1000 [USP'U]/G; [USP'U]/G
OINTMENT TOPICAL PRN
Status: CANCELLED | OUTPATIENT
Start: 2025-08-04

## 2025-08-04 RX ORDER — LIDOCAINE 50 MG/G
OINTMENT TOPICAL PRN
Status: CANCELLED | OUTPATIENT
Start: 2025-08-04

## 2025-08-04 RX ORDER — GINSENG 100 MG
CAPSULE ORAL PRN
Status: CANCELLED | OUTPATIENT
Start: 2025-08-04

## 2025-08-04 RX ORDER — LIDOCAINE HYDROCHLORIDE 20 MG/ML
JELLY TOPICAL PRN
Status: CANCELLED | OUTPATIENT
Start: 2025-08-04

## 2025-08-04 RX ORDER — SODIUM CHLOR/HYPOCHLOROUS ACID 0.033 %
SOLUTION, IRRIGATION IRRIGATION PRN
Status: CANCELLED | OUTPATIENT
Start: 2025-08-04

## 2025-08-04 RX ORDER — BETAMETHASONE DIPROPIONATE 0.5 MG/G
CREAM TOPICAL PRN
Status: CANCELLED | OUTPATIENT
Start: 2025-08-04

## 2025-08-04 RX ORDER — LIDOCAINE HYDROCHLORIDE 20 MG/ML
JELLY TOPICAL PRN
Status: DISCONTINUED | OUTPATIENT
Start: 2025-08-04 | End: 2025-08-05 | Stop reason: HOSPADM

## 2025-08-04 RX ORDER — GENTAMICIN SULFATE 1 MG/G
OINTMENT TOPICAL PRN
Status: CANCELLED | OUTPATIENT
Start: 2025-08-04

## 2025-08-04 RX ORDER — TRIAMCINOLONE ACETONIDE 1 MG/G
OINTMENT TOPICAL PRN
Status: CANCELLED | OUTPATIENT
Start: 2025-08-04

## 2025-08-04 RX ORDER — NEOMYCIN/BACITRACIN/POLYMYXINB 3.5-400-5K
OINTMENT (GRAM) TOPICAL PRN
Status: CANCELLED | OUTPATIENT
Start: 2025-08-04

## 2025-08-04 RX ORDER — MUPIROCIN 2 %
OINTMENT (GRAM) TOPICAL PRN
Status: CANCELLED | OUTPATIENT
Start: 2025-08-04

## 2025-08-04 RX ORDER — SILVER SULFADIAZINE 10 MG/G
CREAM TOPICAL PRN
Status: CANCELLED | OUTPATIENT
Start: 2025-08-04

## 2025-08-04 RX ORDER — LIDOCAINE 40 MG/G
CREAM TOPICAL PRN
Status: CANCELLED | OUTPATIENT
Start: 2025-08-04

## 2025-08-04 RX ORDER — CLOBETASOL PROPIONATE 0.5 MG/G
OINTMENT TOPICAL PRN
Status: CANCELLED | OUTPATIENT
Start: 2025-08-04

## 2025-08-04 RX ORDER — LIDOCAINE HYDROCHLORIDE 40 MG/ML
SOLUTION TOPICAL PRN
Status: CANCELLED | OUTPATIENT
Start: 2025-08-04